# Patient Record
Sex: FEMALE | Race: WHITE | NOT HISPANIC OR LATINO | URBAN - METROPOLITAN AREA
[De-identification: names, ages, dates, MRNs, and addresses within clinical notes are randomized per-mention and may not be internally consistent; named-entity substitution may affect disease eponyms.]

---

## 2017-11-27 ENCOUNTER — HOSPITAL ENCOUNTER (OUTPATIENT)
Dept: MAMMOGRAPHY | Facility: HOSPITAL | Age: 62
Discharge: HOME | End: 2017-11-27
Attending: FAMILY MEDICINE

## 2017-11-27 DIAGNOSIS — Z12.31 VISIT FOR SCREENING MAMMOGRAM: ICD-10-CM

## 2018-07-11 ENCOUNTER — RECORDS - HEALTHEAST (OUTPATIENT)
Dept: LAB | Facility: CLINIC | Age: 63
End: 2018-07-11

## 2018-07-11 LAB
ALBUMIN SERPL-MCNC: 3.7 G/DL (ref 3.5–5)
ALP SERPL-CCNC: 77 U/L (ref 45–120)
ALT SERPL W P-5'-P-CCNC: 27 U/L (ref 0–45)
ANION GAP SERPL CALCULATED.3IONS-SCNC: 9 MMOL/L (ref 5–18)
AST SERPL W P-5'-P-CCNC: 22 U/L (ref 0–40)
BILIRUB SERPL-MCNC: 0.4 MG/DL (ref 0–1)
BUN SERPL-MCNC: 15 MG/DL (ref 8–22)
CALCIUM SERPL-MCNC: 9.3 MG/DL (ref 8.5–10.5)
CHLORIDE BLD-SCNC: 111 MMOL/L (ref 98–107)
CHOLEST SERPL-MCNC: 174 MG/DL
CO2 SERPL-SCNC: 22 MMOL/L (ref 22–31)
CREAT SERPL-MCNC: 0.76 MG/DL (ref 0.6–1.1)
FASTING STATUS PATIENT QL REPORTED: NORMAL
GFR SERPL CREATININE-BSD FRML MDRD: >60 ML/MIN/1.73M2
GLUCOSE BLD-MCNC: 84 MG/DL (ref 70–125)
HDLC SERPL-MCNC: 55 MG/DL
LDLC SERPL CALC-MCNC: 103 MG/DL
POTASSIUM BLD-SCNC: 4.5 MMOL/L (ref 3.5–5)
PROT SERPL-MCNC: 6.7 G/DL (ref 6–8)
SODIUM SERPL-SCNC: 142 MMOL/L (ref 136–145)
TRIGL SERPL-MCNC: 79 MG/DL
TSH SERPL DL<=0.005 MIU/L-ACNC: 1.33 UIU/ML (ref 0.3–5)

## 2018-07-12 LAB
HPV SOURCE: NORMAL
HUMAN PAPILLOMA VIRUS 16 DNA: NEGATIVE
HUMAN PAPILLOMA VIRUS 18 DNA: NEGATIVE
HUMAN PAPILLOMA VIRUS FINAL DIAGNOSIS: NORMAL
HUMAN PAPILLOMA VIRUS OTHER HR: NEGATIVE
SPECIMEN DESCRIPTION: NORMAL

## 2018-07-18 LAB
BKR LAB AP ABNORMAL BLEEDING: NO
BKR LAB AP BIRTH CONTROL/HORMONES: NORMAL
BKR LAB AP CERVICAL APPEARANCE: NORMAL
BKR LAB AP GYN ADEQUACY: NORMAL
BKR LAB AP GYN INTERPRETATION: NORMAL
BKR LAB AP HPV REFLEX: NORMAL
BKR LAB AP LMP: 2008
BKR LAB AP PATIENT STATUS: NORMAL
BKR LAB AP PREVIOUS ABNORMAL: NORMAL
BKR LAB AP PREVIOUS NORMAL: NORMAL
HIGH RISK?: NO
PATH REPORT.COMMENTS IMP SPEC: NORMAL
RESULT FLAG (HE HISTORICAL CONVERSION): NORMAL

## 2018-08-28 ENCOUNTER — COMMUNICATION - HEALTHEAST (OUTPATIENT)
Dept: SURGERY | Facility: CLINIC | Age: 63
End: 2018-08-28

## 2018-09-14 ENCOUNTER — COMMUNICATION - HEALTHEAST (OUTPATIENT)
Dept: SURGERY | Facility: CLINIC | Age: 63
End: 2018-09-14

## 2018-10-22 ENCOUNTER — COMMUNICATION - HEALTHEAST (OUTPATIENT)
Dept: SURGERY | Facility: CLINIC | Age: 63
End: 2018-10-22

## 2018-10-25 ENCOUNTER — AMBULATORY - HEALTHEAST (OUTPATIENT)
Dept: LAB | Facility: CLINIC | Age: 63
End: 2018-10-25

## 2018-10-25 ENCOUNTER — OFFICE VISIT - HEALTHEAST (OUTPATIENT)
Dept: SURGERY | Facility: CLINIC | Age: 63
End: 2018-10-25

## 2018-10-25 DIAGNOSIS — E66.01 OBESITY, CLASS III, BMI 40-49.9 (MORBID OBESITY) (H): ICD-10-CM

## 2018-10-25 LAB
ERYTHROCYTE [DISTWIDTH] IN BLOOD BY AUTOMATED COUNT: 12.7 % (ref 11–14.5)
HCT VFR BLD AUTO: 41.4 % (ref 35–47)
HGB BLD-MCNC: 14.1 G/DL (ref 12–16)
MCH RBC QN AUTO: 28.5 PG (ref 27–34)
MCHC RBC AUTO-ENTMCNC: 34.1 G/DL (ref 32–36)
MCV RBC AUTO: 83 FL (ref 80–100)
PLATELET # BLD AUTO: 246 THOU/UL (ref 140–440)
PMV BLD AUTO: 8.2 FL (ref 7–10)
PTH-INTACT SERPL-MCNC: 119 PG/ML (ref 10–86)
RBC # BLD AUTO: 4.96 MILL/UL (ref 3.8–5.4)
VIT B12 SERPL-MCNC: 528 PG/ML (ref 213–816)
WBC: 6.3 THOU/UL (ref 4–11)

## 2018-10-25 RX ORDER — CHLORAL HYDRATE 500 MG
2 CAPSULE ORAL DAILY
Status: SHIPPED | COMMUNITY
Start: 2018-10-25

## 2018-10-25 ASSESSMENT — MIFFLIN-ST. JEOR: SCORE: 1538.02

## 2018-10-26 ENCOUNTER — AMBULATORY - HEALTHEAST (OUTPATIENT)
Dept: SURGERY | Facility: CLINIC | Age: 63
End: 2018-10-26

## 2018-10-26 ENCOUNTER — RECORDS - HEALTHEAST (OUTPATIENT)
Dept: ADMINISTRATIVE | Facility: OTHER | Age: 63
End: 2018-10-26

## 2018-10-26 ENCOUNTER — COMMUNICATION - HEALTHEAST (OUTPATIENT)
Dept: SURGERY | Facility: CLINIC | Age: 63
End: 2018-10-26

## 2018-10-26 DIAGNOSIS — E21.1 SECONDARY HYPERPARATHYROIDISM, NON-RENAL (H): ICD-10-CM

## 2018-10-26 DIAGNOSIS — R79.89 LOW VITAMIN D LEVEL: ICD-10-CM

## 2018-10-26 LAB — 25(OH)D3 SERPL-MCNC: 26.7 NG/ML (ref 30–80)

## 2018-10-31 ENCOUNTER — OFFICE VISIT - HEALTHEAST (OUTPATIENT)
Dept: SURGERY | Facility: CLINIC | Age: 63
End: 2018-10-31

## 2018-10-31 DIAGNOSIS — Z71.3 DIETARY COUNSELING: ICD-10-CM

## 2018-10-31 DIAGNOSIS — K76.0 FATTY LIVER DISEASE, NONALCOHOLIC: ICD-10-CM

## 2018-10-31 DIAGNOSIS — E66.01 OBESITY, MORBID, BMI 40.0-49.9 (H): ICD-10-CM

## 2018-10-31 ASSESSMENT — MIFFLIN-ST. JEOR: SCORE: 1533.03

## 2018-12-05 ENCOUNTER — OFFICE VISIT - HEALTHEAST (OUTPATIENT)
Dept: SURGERY | Facility: CLINIC | Age: 63
End: 2018-12-05

## 2018-12-05 DIAGNOSIS — E66.01 OBESITY, CLASS III, BMI 40-49.9 (MORBID OBESITY) (H): ICD-10-CM

## 2018-12-05 ASSESSMENT — MIFFLIN-ST. JEOR: SCORE: 1521.69

## 2019-01-09 ENCOUNTER — OFFICE VISIT - HEALTHEAST (OUTPATIENT)
Dept: SURGERY | Facility: CLINIC | Age: 64
End: 2019-01-09

## 2019-01-09 DIAGNOSIS — Z71.3 NUTRITIONAL COUNSELING: ICD-10-CM

## 2019-01-09 DIAGNOSIS — E66.01 OBESITY, CLASS III, BMI 40-49.9 (MORBID OBESITY) (H): ICD-10-CM

## 2019-01-09 DIAGNOSIS — K76.0 FATTY LIVER DISEASE, NONALCOHOLIC: ICD-10-CM

## 2019-01-09 ASSESSMENT — MIFFLIN-ST. JEOR: SCORE: 1517.61

## 2019-01-24 ENCOUNTER — HOSPITAL ENCOUNTER (OUTPATIENT)
Dept: MAMMOGRAPHY | Facility: CLINIC | Age: 64
Discharge: HOME | End: 2019-01-24
Attending: FAMILY MEDICINE

## 2019-01-24 DIAGNOSIS — Z12.31 VISIT FOR SCREENING MAMMOGRAM: ICD-10-CM

## 2019-01-31 ENCOUNTER — OFFICE VISIT - HEALTHEAST (OUTPATIENT)
Dept: SURGERY | Facility: CLINIC | Age: 64
End: 2019-01-31

## 2019-01-31 DIAGNOSIS — R79.89 LOW VITAMIN D LEVEL: ICD-10-CM

## 2019-01-31 DIAGNOSIS — E66.01 OBESITY, CLASS III, BMI 40-49.9 (MORBID OBESITY) (H): ICD-10-CM

## 2019-01-31 DIAGNOSIS — F50.814 BINGE-EATING DISORDER, IN PARTIAL REMISSION, MILD: ICD-10-CM

## 2019-01-31 DIAGNOSIS — E21.1 SECONDARY HYPERPARATHYROIDISM, NON-RENAL (H): ICD-10-CM

## 2019-01-31 ASSESSMENT — MIFFLIN-ST. JEOR: SCORE: 1504.45

## 2019-03-01 ENCOUNTER — OFFICE VISIT - HEALTHEAST (OUTPATIENT)
Dept: SURGERY | Facility: CLINIC | Age: 64
End: 2019-03-01

## 2019-03-01 DIAGNOSIS — Z71.3 NUTRITIONAL COUNSELING: ICD-10-CM

## 2019-03-01 DIAGNOSIS — K76.0 FATTY LIVER DISEASE, NONALCOHOLIC: ICD-10-CM

## 2019-03-01 DIAGNOSIS — E66.01 OBESITY, CLASS III, BMI 40-49.9 (MORBID OBESITY) (H): ICD-10-CM

## 2019-03-01 ASSESSMENT — MIFFLIN-ST. JEOR: SCORE: 1526.22

## 2019-03-21 ENCOUNTER — OFFICE VISIT - HEALTHEAST (OUTPATIENT)
Dept: SURGERY | Facility: CLINIC | Age: 64
End: 2019-03-21

## 2019-03-21 DIAGNOSIS — E66.01 OBESITY, CLASS III, BMI 40-49.9 (MORBID OBESITY) (H): ICD-10-CM

## 2019-03-21 DIAGNOSIS — E21.1 SECONDARY HYPERPARATHYROIDISM, NON-RENAL (H): ICD-10-CM

## 2019-03-21 DIAGNOSIS — R79.89 LOW VITAMIN D LEVEL: ICD-10-CM

## 2019-03-21 ASSESSMENT — MIFFLIN-ST. JEOR: SCORE: 1496.74

## 2019-04-24 ENCOUNTER — OFFICE VISIT - HEALTHEAST (OUTPATIENT)
Dept: SURGERY | Facility: CLINIC | Age: 64
End: 2019-04-24

## 2019-04-24 DIAGNOSIS — K76.0 FATTY LIVER DISEASE, NONALCOHOLIC: ICD-10-CM

## 2019-04-24 DIAGNOSIS — Z71.3 NUTRITIONAL COUNSELING: ICD-10-CM

## 2019-04-24 DIAGNOSIS — E66.01 OBESITY, CLASS III, BMI 40-49.9 (MORBID OBESITY) (H): ICD-10-CM

## 2019-04-24 ASSESSMENT — MIFFLIN-ST. JEOR: SCORE: 1498.55

## 2019-05-09 ENCOUNTER — AMBULATORY - HEALTHEAST (OUTPATIENT)
Dept: SURGERY | Facility: CLINIC | Age: 64
End: 2019-05-09

## 2019-05-09 DIAGNOSIS — E66.01 OBESITY, CLASS III, BMI 40-49.9 (MORBID OBESITY) (H): ICD-10-CM

## 2019-05-22 ENCOUNTER — OFFICE VISIT - HEALTHEAST (OUTPATIENT)
Dept: SURGERY | Facility: CLINIC | Age: 64
End: 2019-05-22

## 2019-05-22 ENCOUNTER — AMBULATORY - HEALTHEAST (OUTPATIENT)
Dept: LAB | Facility: CLINIC | Age: 64
End: 2019-05-22

## 2019-05-22 DIAGNOSIS — E21.1 SECONDARY HYPERPARATHYROIDISM, NON-RENAL (H): ICD-10-CM

## 2019-05-22 DIAGNOSIS — E66.812 OBESITY, CLASS II, BMI 35-39.9: ICD-10-CM

## 2019-05-22 DIAGNOSIS — R79.89 LOW VITAMIN D LEVEL: ICD-10-CM

## 2019-05-22 LAB — PTH-INTACT SERPL-MCNC: 86 PG/ML (ref 10–86)

## 2019-05-22 ASSESSMENT — MIFFLIN-ST. JEOR: SCORE: 1468.62

## 2019-05-23 LAB — 25(OH)D3 SERPL-MCNC: 39.7 NG/ML (ref 30–80)

## 2019-05-29 ENCOUNTER — COMMUNICATION - HEALTHEAST (OUTPATIENT)
Dept: SURGERY | Facility: CLINIC | Age: 64
End: 2019-05-29

## 2019-06-26 ENCOUNTER — AMBULATORY - HEALTHEAST (OUTPATIENT)
Dept: SURGERY | Facility: CLINIC | Age: 64
End: 2019-06-26

## 2019-06-26 DIAGNOSIS — E66.01 OBESITY, CLASS III, BMI 40-49.9 (MORBID OBESITY) (H): ICD-10-CM

## 2019-08-01 ENCOUNTER — OFFICE VISIT - HEALTHEAST (OUTPATIENT)
Dept: SURGERY | Facility: CLINIC | Age: 64
End: 2019-08-01

## 2019-08-01 DIAGNOSIS — E66.01 OBESITY, CLASS III, BMI 40-49.9 (MORBID OBESITY) (H): ICD-10-CM

## 2019-08-01 DIAGNOSIS — R79.89 LOW VITAMIN D LEVEL: ICD-10-CM

## 2019-08-01 RX ORDER — MULTIPLE VITAMINS W/ MINERALS TAB 9MG-400MCG
1 TAB ORAL DAILY
Status: SHIPPED | COMMUNITY
Start: 2019-08-01

## 2019-08-01 RX ORDER — ASPIRIN 81 MG/1
81 TABLET, CHEWABLE ORAL AT BEDTIME
Status: SHIPPED | COMMUNITY
Start: 2019-08-01

## 2019-08-01 ASSESSMENT — MIFFLIN-ST. JEOR: SCORE: 1486.31

## 2019-10-02 ENCOUNTER — AMBULATORY - HEALTHEAST (OUTPATIENT)
Dept: SURGERY | Facility: CLINIC | Age: 64
End: 2019-10-02

## 2019-10-02 DIAGNOSIS — E66.01 OBESITY, CLASS III, BMI 40-49.9 (MORBID OBESITY) (H): ICD-10-CM

## 2019-10-07 ENCOUNTER — AMBULATORY - HEALTHEAST (OUTPATIENT)
Dept: SURGERY | Facility: CLINIC | Age: 64
End: 2019-10-07

## 2019-10-07 DIAGNOSIS — E66.01 OBESITY, CLASS III, BMI 40-49.9 (MORBID OBESITY) (H): ICD-10-CM

## 2019-10-09 ENCOUNTER — OFFICE VISIT - HEALTHEAST (OUTPATIENT)
Dept: SURGERY | Facility: CLINIC | Age: 64
End: 2019-10-09

## 2019-10-09 DIAGNOSIS — F50.819 BINGE EATING DISORDER: ICD-10-CM

## 2019-10-09 DIAGNOSIS — F43.9 STRESS AT HOME: ICD-10-CM

## 2019-10-09 DIAGNOSIS — E66.01 OBESITY, CLASS III, BMI 40-49.9 (MORBID OBESITY) (H): ICD-10-CM

## 2019-10-09 ASSESSMENT — MIFFLIN-ST. JEOR: SCORE: 1488.12

## 2019-11-20 ENCOUNTER — RECORDS - HEALTHEAST (OUTPATIENT)
Dept: LAB | Facility: CLINIC | Age: 64
End: 2019-11-20

## 2019-11-20 LAB
ALBUMIN SERPL-MCNC: 3.4 G/DL (ref 3.5–5)
ALP SERPL-CCNC: 77 U/L (ref 45–120)
ALT SERPL W P-5'-P-CCNC: 19 U/L (ref 0–45)
ANION GAP SERPL CALCULATED.3IONS-SCNC: 7 MMOL/L (ref 5–18)
AST SERPL W P-5'-P-CCNC: 16 U/L (ref 0–40)
BILIRUB SERPL-MCNC: 0.4 MG/DL (ref 0–1)
BUN SERPL-MCNC: 13 MG/DL (ref 8–22)
CALCIUM SERPL-MCNC: 9 MG/DL (ref 8.5–10.5)
CHLORIDE BLD-SCNC: 103 MMOL/L (ref 98–107)
CHOLEST SERPL-MCNC: 141 MG/DL
CO2 SERPL-SCNC: 27 MMOL/L (ref 22–31)
CREAT SERPL-MCNC: 0.74 MG/DL (ref 0.6–1.1)
FASTING STATUS PATIENT QL REPORTED: NORMAL
GFR SERPL CREATININE-BSD FRML MDRD: >60 ML/MIN/1.73M2
GLUCOSE BLD-MCNC: 81 MG/DL (ref 70–125)
HDLC SERPL-MCNC: 50 MG/DL
LDLC SERPL CALC-MCNC: 77 MG/DL
MAGNESIUM SERPL-MCNC: 2 MG/DL (ref 1.8–2.6)
POTASSIUM BLD-SCNC: 4.6 MMOL/L (ref 3.5–5)
PROT SERPL-MCNC: 6.4 G/DL (ref 6–8)
SODIUM SERPL-SCNC: 137 MMOL/L (ref 136–145)
TRIGL SERPL-MCNC: 71 MG/DL
TSH SERPL DL<=0.005 MIU/L-ACNC: 1.07 UIU/ML (ref 0.3–5)

## 2019-12-19 ENCOUNTER — OFFICE VISIT - HEALTHEAST (OUTPATIENT)
Dept: SURGERY | Facility: CLINIC | Age: 64
End: 2019-12-19

## 2019-12-19 DIAGNOSIS — R63.2 BINGE EATING: ICD-10-CM

## 2019-12-19 DIAGNOSIS — E66.01 OBESITY, CLASS III, BMI 40-49.9 (MORBID OBESITY) (H): ICD-10-CM

## 2019-12-19 RX ORDER — TOPIRAMATE 25 MG/1
TABLET, FILM COATED ORAL
Qty: 180 TABLET | Refills: 0 | Status: SHIPPED | OUTPATIENT
Start: 2019-12-19

## 2019-12-19 RX ORDER — PANTOPRAZOLE SODIUM 40 MG/1
40 TABLET, DELAYED RELEASE ORAL DAILY
Status: SHIPPED | COMMUNITY
Start: 2019-10-22

## 2019-12-19 ASSESSMENT — MIFFLIN-ST. JEOR: SCORE: 1497.19

## 2020-01-27 ENCOUNTER — COMMUNICATION - HEALTHEAST (OUTPATIENT)
Dept: SURGERY | Facility: CLINIC | Age: 65
End: 2020-01-27

## 2020-12-21 ENCOUNTER — RECORDS - HEALTHEAST (OUTPATIENT)
Dept: LAB | Facility: CLINIC | Age: 65
End: 2020-12-21

## 2020-12-21 LAB
ALBUMIN SERPL-MCNC: 3.7 G/DL (ref 3.5–5)
ALP SERPL-CCNC: 75 U/L (ref 45–120)
ALT SERPL W P-5'-P-CCNC: 28 U/L (ref 0–45)
ANION GAP SERPL CALCULATED.3IONS-SCNC: 7 MMOL/L (ref 5–18)
AST SERPL W P-5'-P-CCNC: 25 U/L (ref 0–40)
BILIRUB SERPL-MCNC: 0.6 MG/DL (ref 0–1)
BUN SERPL-MCNC: 15 MG/DL (ref 8–22)
CALCIUM SERPL-MCNC: 9.1 MG/DL (ref 8.5–10.5)
CHLORIDE BLD-SCNC: 108 MMOL/L (ref 98–107)
CHOLEST SERPL-MCNC: 172 MG/DL
CO2 SERPL-SCNC: 27 MMOL/L (ref 22–31)
CREAT SERPL-MCNC: 0.77 MG/DL (ref 0.6–1.1)
FASTING STATUS PATIENT QL REPORTED: NORMAL
GFR SERPL CREATININE-BSD FRML MDRD: >60 ML/MIN/1.73M2
GLUCOSE BLD-MCNC: 93 MG/DL (ref 70–125)
HDLC SERPL-MCNC: 54 MG/DL
LDLC SERPL CALC-MCNC: 99 MG/DL
POTASSIUM BLD-SCNC: 4.6 MMOL/L (ref 3.5–5)
PROT SERPL-MCNC: 6.6 G/DL (ref 6–8)
SODIUM SERPL-SCNC: 142 MMOL/L (ref 136–145)
TRIGL SERPL-MCNC: 95 MG/DL
TSH SERPL DL<=0.005 MIU/L-ACNC: 1.85 UIU/ML (ref 0.3–5)

## 2020-12-22 LAB
25(OH)D3 SERPL-MCNC: 41.8 NG/ML (ref 30–80)
BACTERIA SPEC CULT: NORMAL

## 2021-05-25 ENCOUNTER — RECORDS - HEALTHEAST (OUTPATIENT)
Dept: ADMINISTRATIVE | Facility: CLINIC | Age: 66
End: 2021-05-25

## 2021-05-27 ENCOUNTER — RECORDS - HEALTHEAST (OUTPATIENT)
Dept: ADMINISTRATIVE | Facility: CLINIC | Age: 66
End: 2021-05-27

## 2021-05-28 ENCOUNTER — RECORDS - HEALTHEAST (OUTPATIENT)
Dept: ADMINISTRATIVE | Facility: CLINIC | Age: 66
End: 2021-05-28

## 2021-05-28 NOTE — PROGRESS NOTES
Non-surgical Weight Loss Follow Up Diet Evaluation    Assessment:  This patient is a 63 y.o. female is being seen today for follow-up non-surgical nutritional evaluation. Today we reviewed the patients current eating habits and level of physical activity, and instructed on the changes that are required for successful weight loss outcomes.    Bupropion/Naltrexone:taking, patient reports that this has helped with her binge eating    Pt's Initial Weight: 229 lbs  Weight: 220 lb 4.8 oz (99.9 kg)  Weight loss from initial: 8.7  % Weight loss: 3.8 %    BMI: Body mass index is 40.62 kg/m .  IBW: 105-110 lbs    Personal goal weight: 150lb     Pt Active Problem List Diagnosis:    Patient Active Problem List   Diagnosis     Obesity, Class III, BMI 40-49.9 (morbid obesity) (H)     Secondary hyperparathyroidism, non-renal (H)     Low vitamin D level     Binge-eating disorder, in partial remission, mild       Estimated RMR (Charlotte-St Jeor equation): 1501 calories  Protein requirements (.5grams to .9grams per pound IBW, 20-30% of calories, minimum of 60-80gm per day):  53-94 grams    Progress made since last visit: patient is getting out and moving more, she has gone for a bike ride twice     Concerns: stress of taking care of her , portion control at times      We discussed the timing of meals today. The patient wants to eat her dinner meal a bit earlier in the day to help with her GERD.   Patient and I reviewed the importance of eating three consistent meals per day; as well as meal timing to be spaced 4-5 hours apart.  Snack choices: 100-150 calories (1-2x/day if physically hungry), incorporating a fruit/vegetable w/ protein source.    Portion Sizes problematic? YES per patient/diet recall  Encouraged slowing meal times down, 20-30 minutes, chewing to applesauce consistency.   To aid in proper portion control and slow meal time down discussed consuming meals off smaller plates, use toddler/children utensils and set  utensils down after each bite.    Protein, vegetables/fruits, carbohydrates: Patient is doing well with balance, but reports eating a bit more sweets over the holiday weekend.  The patient and I discussed the importance of including lean/low fat protein at each meal and limiting carbohydrate intake to less than 25% of plate volume.       Beverages (Type/Oz. per day)  Water: mineral water for a treat in the evening    Patient has stopped drinking wine.    Discussed the importance of adequate hydration and the goal of 64+ oz of fluid daily.   The patient understands the importance of  avoiding all sweetened and alcoholic drinks, and instead choosing 64 oz plain water.    Exercise  Exercise bike, treadmill, walking and riding bike- 5 days per week    Pt's understands that 45-60 minutes of daily activity is an important part of weight loss success.   Encouraged pt to incorporate  strength training exercise in addition to cardiovascular exercise most days of the week.    PES statement:     1. (NB-1.7) Undesirable food choices related to lack of motivation and/or readiness to apply change as evidenced by inaccurate or incomplete understanding of the bariatric guidelines; inability to apply guideline information; inability to select, or unwillingness or disinterest in selecting, food consistent with the bariatric guidelines      Intervention:  Discussion:  1. Educated pt on GERD Medical Nutrition Therapy handout.  2. Reviewed lean protein sources.    3. Plate Method: The patient and I discussed the importance of including lean/low  fat protein at each meal and limiting carbohydrate intake to less  than 25% of plate volume.  Instructions/Goals:   1. Track food in journal and record weight 2-3 times per week. Patient was also encouraged to track her GERD symptoms to pin point foods that may be causing problems.  2. Go to the farmer's market    Handouts Provided:  GERD MNT handout    Monitor/Evaluation:    Pt will f/u in one  month with bariatrician, and f/u in two months with RD.    Plan for next visit with RD:  Review plate method   Educated pt on food labels  Review carbohydrates/fiber  Exercise      Time In: 1:40p  Time Out: 2:10p      ABN signed: Yes

## 2021-05-29 ENCOUNTER — RECORDS - HEALTHEAST (OUTPATIENT)
Dept: ADMINISTRATIVE | Facility: CLINIC | Age: 66
End: 2021-05-29

## 2021-05-29 NOTE — PATIENT INSTRUCTIONS - HE
Plan:  1. Excellent work on approaching your 10% weight reduction goal of 207 lbs. Continue with your bupropion and naltrexone regimen, we can consider adding Plenity this Fall when available if affordable.   2. Continue walking goals of 10,000 steps most days of the week and adding in some fun activity on weekends, consider paddle sports on [place].  3. Due for recheck of D and PTH levels today, I'll update you with results next week.        Bupropion/Naltrexone Patient Information (trade name, CONTRAVE)    This medication is used for weight loss.  In studies people lost an averaged 5-8% of their starting weight compared to placebo (0-1%).    Often, this medication will be written as 2 separate prescriptions to improve cost to the patient. The trade name drug CONTRAVE comes as a combination of 8mg naltrexone/90mg bupropion and a 3 week escalating dose regimen going from one tablet daily up to a max of 2 tabs twice daily:  Week 1: one tablet in the daytime.  Week 2: one tablet A.M.  And One tablet in the PM.  Week 3: 2 tabs AM  And One tablet in the PM.  Week 4: 2 tabs AM and 2 tabs PM.     The generic Rx I write for is as follows:  I recommend starting One 75 mg bupropion and 1/2 a tablet of naltrexone (25mg) daily for 10 days and then moving up to One 75 mg bupropion tablet twice daily and half a naltrexone tablet twice daily.  Depending on your results/tolerance, we may increase the Bupropion up to a maximum of 150 mg twice daily of the immediate release medication or one Bupropion  mg-300 mg daily. Sometimes we'll have to go slower with the dose escalation.    Like any weight loss medication, showing results and having tolerable or no side effects is vital to continuing therapy and if either no significant weight loss after 8-12 weeks or too many side effects then we would discontinue therapy and look for alternative options/assistance.    AVOID taking with high fat meals as this increases bupropion  levels, but some food in the stomach can help decrease nausea side effects from the Naltrexone.    People who take Metoprolol may need to decrease their dose of metoprolol as the bupropion increases the effect of metoprolol 2-4 fold in some cases and low blood pressure/low heart rate could occur.    Patients should STOP CONTRAVE if they have a severe allergic reaction to CONTRAVE.  Patients should be told that CONTRAVE should be discontinued and not restarted if they experience a seizure while on treatment.    Patients should be advised that the excessive use or abrupt discontinuation of alcohol,benzodiazepines, antiepileptic drugs, or sedatives/hypnotics can increase the risk of seizure.    Patients should be advised to minimize or avoid use of alcohol.    Patients should be advised that if they previously used opioids, they may be more sensitive to lower doses of opioids and at risk of accidental overdose should they use opioids after CONTRAVE treatment is discontinued or temporarily interrupted.  Patients should be advised that because naltrexone, a component of CONTRAVE, can block the effects of opioids, they will not perceive any effect if they attempt to self-administer anyopioid drug in small doses while on CONTRAVE. Further advise patients that the attempt to administer large doses of any opioid or to bypass the blockade while on CONTRAVE may lead to serious injury, coma, or death.    Patients should be off all opioids for a minimum of 7 to 10 days before starting CONTRAVE in order to avoid precipitation of withdrawal. Advise patients they should not take CONTRAVE if they have any symptoms of opioid withdrawal.   Patients should be advised to call their healthcare provider if they experience increased blood pressure or heart rate.  Patients should be advised to notify their healthcare provider if they are taking, or plan to take, any prescription or over-the-counter drugs. Concern is warranted because  CONTRAVE and other drugs may affect each other s metabolism.  Patients should be advised to notify their healthcare provider if they become pregnant, intend to become pregnant, or are breastfeeding during therapy.  CONTRAVE should NOT be taken if pregnant or nursing.    Patients with type 2 diabetes mellitus on antidiabetic therapy should be advised to monitor their blood glucose levels and report symptoms of hypoglycemia to their healthcare provider(s).    Patients should be advised to swallow CONTRAVE tablets whole so that the release rate is not altered. Do not chew, divide, or crush tablets if using the Trade drug Contrave, dividing the generic naltrexone is fine.    As always, if any questions/concerns, don't hesitate to call us at the [facility] clinic.    Kalia Rojas MD  [phone].  5/22/2019          Exercise Guidance    Nearly everything that bothers us gets better when the proper amount of exercise can be done in the proper amounts.  Getting to that level safely and without injury is the key.  When it comes to weight loss, exercise is especially important in maintaining the weight loss.  Unfortunately, one of the harsh realities is that substantial weight loss slows our metabolism, often anywhere from 5-20%.    Our brain always remembers our heaviest weight and we can return to that if we're not mindful and moving regularly.  Our biology doesn't understand the concept of having too much energy, only not having enough.  As such, when we lose weight, it's thought that the brain interprets this as we're ill or in a famine and dials back our metabolism to limit further weight loss.  This is why exercise is so important in keeping the weight off and is the main reason people have some weight regain from their low weight point after weight loss.  We have to make up that 10-20% of calories not being burned.Since we can restrict our intake for only so long, exercise becomes very  important in our long term healthy weigh maintenance to balance out the occasional indiscretion.    Generally, for every 5% body weight reduction in a weight loss season, a person needs to add  kilocalories of exercise in their daily routine to keep that weight off for the long term.  This is why it's vital to be starting your fitness regimen during weight loss season, it becomes so vital for long term success in maintaining that weight loss.    Additionally, all sorts of good enzymes and genes turn on with exercise and our stress, sleep, mood and bodies feel better when we can get to the point of making ourselves a little sweaty and short of breath 35-50 minutes most days of the week.    Who isn't ready for exercise? Well, if you get severe dizziness/palpitations, chest pain or short of breath/faint with even minimal activity like walking across a room or you're having to pause while going up a flight of stairs, then getting your heart and/or lungs fully evaluated prior to starting an exercise regimen is recommended. Everyone else can probably start a program, but everyone may start at a different point:  Some can set a 5-10 minute walking goal and others will be able to ride their bike for an hour.      Start with where you're at and look to add 10% more each week until you're at that 150 minutes or more a week (or 75 minutes/week or more of vigorous exercise). Moderate exercise can be estimated as the pace you can carry on a conversation and vigorous is the pace at which you can get 3-5 words out before having to take a breath.  If you're using heart rate monitoring, Moderate is about 60% of your maximum heart rate and vigorous about 75%. (Max heart rate estimated as 220 beats minus your age:  Example: 220-age of 45 =176 Beats per minute (BPM) maximum. 0.6X 176= 105 BPM (moderate), 132 BMP(vigorous)).    If you like to count steps, the 10,000 steps per day does correlate well with weight maintenance but try  to make at least 20-25% of those steps at a brisk pace (like you are about to miss your bus).    Let's move!  Kalia Rojas MD.         To help lose weight in a safe way and sustainable way, I'd like to start you on a 1300 calorie diet each day.  Understanding that every 3500 calorie deficit adds up to a pound of weight reduction, with the combination of light aerobic exercise, some modest weight training and diet, we should be able to lose around 1 to 2.5lbs weekly depending on your starting height and weight.    Hunger and fatigue are the enemies of weight loss and behavior change in general.  We become much more habit and reactive in our eating when we're hungry and/or tired and frequently have less self control.  It's not a personal failing, it's just body chemistry.   We can combat this by trying to avoid being tired and hungry at the same time.  To help this,  try to front load your calories in the first 10 hours of you day so you get into the fatigued evening hours reasonably full and you can control impulses/mindless eating a lot better and avoid those bedtime snacks/evening treats that the tired brain craves.    Weight loss goes through ups and downs and plateaus but if you stay on the program, you will enjoy success.   Commit to the process, try to be good at least 19 days out of 20 and continue to think about why you're doing this and what you're working towards. If you haven't thought of your reward for hitting your weight loss goals, think about it now. Using these little victory bribes along the way helps a lot.    Finding a diet that is satisfying and repeatable-- day in and day out, improves success.  An outline of how to break up the day's food is given below.  It is a starting point and example of what a 1300 calorie diet looks like and you can modify the listed foods to suite your particular tastes, but pay attention to portions.   Do not skip breakfast as it will leave you hungry and lead to  overeating at some point during the rest of the day.  If you can make Supper the last food for the day more days of the week then not it can help.  That means that those first 10-12 hours of the day and hitting your protein/intake targets for all three meals is vital to your success and evening hunger.    Start reading labels so you know that you're getting what you think you are and start measuring foods so you can eventually look at a portion and understand how it will provide the fuel you want.    Prepping raw veggies after you buy them (washing and putting into bags/tupperware for easy access), cooking several chicken breasts for the next 2-3 lunches and generally being able to grab and go what will keep you on target when time is short will greatly aid your success.  Prepare and plan and success will follow.    Read labels:  for protein portions/yogurt, protein bars etc looking for items with more than 10 grams of protein and less than 10 grams of sugar is very helpful.  Frozen meals should have at least 18 grams of protein, under 10 grams of sugar as well (typically around 300 calories).    Please note: if you've had previous bariatric surgery: wait 20-30 minutes before/after eating to drink your beverages to avoid early fullness/dumping syndrome/worsening malabsorption, early loss of fullness and hunger.  Start with eating your protein first, slow down your meals and chew thoroughly.          1300 calorie diet:  Think Big Breakfast, Medium Lunch, smaller dinner.    Breakfast goal of 300 calories-350 calories (egg, 1/3 to 1/2 cup cooked old fashioned oatmeal or steel cut oats and berries,3-4oz greek yogurt.)Glass of water and if you like coffee (black) or tea.        Mid morning Snack or part of lunch, about 2-2.5 hrs later: 100 calories (cottage cheese/string cheese/ fruit or banana) and a handful of cruchy/green veggies (cucumber/celery/green peppers/broccoli).  Small glass of water    Lunch:  300 calories  (tuna with little bustillo (no bread), apple, salad with drizzle of olive oil/balsamic vinegar for example)  Water    Snack:  100 calories.  4-5 oz Greek Yogurt with at least 17 grams of protein per serving.    Or Cottage cheese (lower sodium version preferable). Get this in about 2 hrs before you plan to have dinner. Protein drinks with at least 15-20 grams of protein and less than 6 grams of sugar could be used here to hit protein goals and decrease afternoon/evening hunger.  Glass of water    Dinner:  350 calories (4 oz meat or fish with cooked veggies or salad with minimal dressing, one piece of bread).  Glass of water or unsweetened tea with lemon    Snack: 100 calories Medium Apple or Small handful of nuts, about 2/3 to 3/4 an ounce (almonds/walnuts/cashews or pistachios are ideal).   Glass of water.    Try to avoid all soda and juice (low sodium V8 ok once a day).   You can do it!    Choose an activity that is fun/interesting and available to you such as  Going for a swim for 15 minutes, walking 40 minutes, elliptical 20 minutes or cycling 20 minutes as many days a week as you can.  If those times are too long for your fitness level, start at 10 minutes of movement and each week try to increase by 2 minutes each week.  The first 70 minutes a week (10 minutes a day only) of exercise drops the mortality rate of a sedentary person 30%!!!!  Our goal is to work up to 150 minutes or more per week of moderate to vigorous exercise  to optimize metabolism and prevent weight regain during maintenance.     10 minutes of weight lifting can be helpful as well or using some body weight exercises like wall squats, wall pushups, seat presses, yoga moves. At least twice weekly helps maintain a good strength to weight ratio, more days is better.    If you are into strenuous weight lifting or prolonged exercise, use an online calculator for how many calories you've burned and if your exercise is lasting over 60 minutes, replace  25-30% of those calories burned immediately after exercise .  For the more limited exercise (less than 500 calories burned), there is no need to add extra food unless you notice a lot of hunger on your food journal.  Usually  Even a  calorie load after longer workouts is more than adequate.  For longer efforts, hunger will increase if you don't refuel afterwards and can get meal plan off track due to hunger, so replenish immediately after the workout to keep on the diet plan and feeling good.    Exercise example:  If you burned 1000 calories during the exercise, immediately (within 30 minutes) have a snack/replacement beverage totaling 250-300 calories and ideally have a 3:1 ratio of carbohydrate grams to protein grams to keep muscles ready for exercise the next day.  Have your next, full meal within 2-3 hours of exercise.    Tips for success:  KEEP A FOOD JOURNAL and a log of daily weights.  1.  Prepare proteins ahead of time (broil chicken breasts in bulk so you can grab and go), steel cut oats can be stored in casserole dish/bowl in the fridge for quick scoop in the morning and rewarm in microwave, make use of crock pot recipes (watch salt content).    2.  Drink a 8-12 oz glass of water every 2-3 hours when awake.  We often mistake hunger for thirst, especially when losing weight.    3. Remember your Reward and Motivation when things get hard.    4.  Weigh yourself every morning and record, you'll stay on track better and learn how your body loses weight. Don't worry about 1 or 2 day patterns, but when on track you'll notice good trend downward of weight over 3-4 day segments.    5. Call or use [employer] messaging if problems/concerns .    6.  Find a handful of meals/foods that keep you on track and get into a boring routine that is sustainable for you.    7.  Take a complete multivitamin just to make sure all micronutrients are adequate during weight loss.    8. If losing hair/brittle nails it usually means  "you are not taking enough protein.  Minimum goal is 60 grams daily of protein, most people with normal kidneys do well with upwards of 100-120 grams/day of protein. Consider taking Biotin as supplement or a \"Hair and Nail\" multivitamin.  If you are hypothyroid and losing hair, see you doctor for a check up of your levels if you haven't had one recently.    9.  Getting adequate sleep is very important for starting your day properly, when we are sleep deprived, our morning appetite is suppressed and without eating an adequate breakfast, we overeat later in the day when we're tired.  Aim for 7 hours of sleep nightly if possible.  If you sleep is disturbed, perform some introspection on stressors/depression/anxiety/PTSD events or possible sleep disorders and we can trouble shoot solutions/evaulations if issues persist.    Exercise during the day, meditative breathing before bed and after waking and removing the television from the bedroom are easy ways to improve quality of sleep.  Most people can tolerate 1-5 mg of melatonin about an 60-90 minutes before they plan to go to bed if these measures aren't helping.    10. Relaxation.  Controlled breathing exercises can lower stress levels in the brain.  One technique is 4, 7, 8 breathing:  Place the tip of your tongue behind your front teeth, breath in through your mouth for 4 seconds, Hold it for 7 seconds and breath out slowly, making a leaky tire noise for 8 seconds.  Repeat 4 times.  Ideally do at the start and end of your day or if feeling stressed.  It works and it's why meditation/yoga/martial arts are often very breath based activities.  There are breathing techniques for alertness as well as relaxation out there and they can be quite helpful.        "

## 2021-05-29 NOTE — PROGRESS NOTES
"Bariatric Clinic Follow-Up Visit:    Ada Palmer is a 63 y.o.  female with Body mass index is 39.4 kg/m .  presenting here today for follow-up on non-surgical efforts for weight loss. Original Intake visit occurred on 10/25/18 with a weight of 229 lbs and BMI of 42.2.  Along with diet and behavior changes, she has been using bupropion/naltrexone to assist her weight loss goals.  See her intake visit notes for details on identified contributors to weight gain in the past.    Weight:   Wt Readings from Last 2 Encounters:   05/22/19 213 lb 11.2 oz (96.9 kg)   04/24/19 220 lb 4.8 oz (99.9 kg)    pounds  Height: 5' 1.75\" (1.568 m) (5/22/2019 10:10 AM)  Initial Weight: 229 lbs (4/24/2019  1:00 PM)  Weight: 213 lb 11.2 oz (96.9 kg) (5/22/2019 10:10 AM)  Weight loss from initial: 8.7 (4/24/2019  1:00 PM)  % Weight loss: 3.8 % (4/24/2019  1:00 PM)  BMI (Calculated): 39.4 (5/22/2019 10:10 AM)  SpO2: 98 % (5/22/2019 10:10 AM)      Comorbidities:  Patient Active Problem List   Diagnosis     Obesity, Class III, BMI 40-49.9 (morbid obesity) (H)     Secondary hyperparathyroidism, non-renal (H)     Low vitamin D level     Binge-eating disorder, in partial remission, mild       Current Outpatient Medications:      buPROPion (WELLBUTRIN) 75 MG tablet, One tablet twice daily.., Disp: 90 tablet, Rfl: 1     crisaborole (EUCRISA) 2 % Oint, DARCY EXT AA BID, Disp: , Rfl:      fluticasone (FLONASE) 50 mcg/actuation nasal spray, 2 sprays into each nostril daily., Disp: , Rfl:      naltrexone (DEPADE) 50 mg tablet, Up to one tablet twice daily., Disp: 180 tablet, Rfl: 1     omega-3/dha/epa/fish oil (FISH OIL-OMEGA-3 FATTY ACIDS) 300-1,000 mg capsule, Take 2 g by mouth daily., Disp: , Rfl:      pantoprazole (PROTONIX) 40 MG tablet, Take 40 mg by mouth daily., Disp: , Rfl:       Interim: Since our last visit, she has continued to lose weight slowly and steadily, now approaching 10% weight loss goal, She's getting 10k steps at least 3 days " weekly and tried a [employer] class this past week. Tolerating bupropion/naltrexone well. Discussed trial of Plenity this fall when available. Is taking D3 4000IUs daily and is due for D/PTH recheck given abnormal levels last fall.     Plan:   1.  Diet: continue 60-80g protein daily  2. Exercise: continue walking and paddle sports/pickle ball  3. Medication: continue bupropion/naltrexone  4.  Stress Reduction:  Doing well but  having need for esophageal stretching.  5. Goals: 207 lbs is 10% weight reduction goal. She's now dropped from Class III obesity to Class II obesity.  6. Low D w/ secondary, non renal hyperparathyrodism. Using 4000IUs D3 daily will recheck levels today.      We discussed [facility]ariat[facility] Basics including:  -eating 3 meals daily  -eating protein first  -eating slowly, chewing food well  -avoiding/limiting calorie containing beverages  -We discussed the importance of restorative sleep and stress management in maintaining a healthy weight.  -We discussed the National Weight Control Registry healthy weight maintenance strategies and ways to optimize metabolism.  -We discussed the importance of physical activity including cardiovascular and strength training in maintaining a healthier weight and explored viable options.    Most recent labs:  Lab Results   Component Value Date    WBC 6.3 10/25/2018    HGB 14.1 10/25/2018    HCT 41.4 10/25/2018    MCV 83 10/25/2018     10/25/2018     Lab Results   Component Value Date    CHOL 174 07/11/2018     Lab Results   Component Value Date    HDL 55 07/11/2018     Lab Results   Component Value Date    LDLCALC 103 07/11/2018     Lab Results   Component Value Date    TRIG 79 07/11/2018     No components found for: CHOLHDL  Lab Results   Component Value Date    ALT 27 07/11/2018    AST 22 07/11/2018    ALKPHOS 77 07/11/2018    BILITOT 0.4 07/11/2018     No results found for: HGBA1C  Lab Results   Component Value Date    AAXQILYP07 528  "10/25/2018     Lab Results   Component Value Date    UFDGRFTW20RZ 26.7 (L) 10/25/2018     No results found for: FERRITIN  Lab Results   Component Value Date     (H) 10/25/2018     No results found for: 58395  No results found for: 7597  Lab Results   Component Value Date    TSH 1.33 07/11/2018     No results found for: TESTOSTERONE    DIETARY HISTORY    Positive Changes Since Last Visit: walking more  Struggling With: stress/loss of some activity w/  as he deals w/ his esophageal issues    Knowledgeable in Reading Food Labels: improved  Getting Adequate Protein: improved  Sleeping 7-8 hours/day often  Stress management walking more    PHYSICAL ACTIVITY PATTERNS:  Cardiovascular: walking  Strength Training: paddle sports     REVIEW OF SYSTEMS  GENERAL/CONSTITUTIONAL:  nl  HEENT:   nl  CARDIOVASCULAR:   nl  PULMONARY:   nl  GASTROINTESTINAL:  nl  UROLOGIC:  nl  NEUROLOGIC:  nl  PSYCHIATRIC:  nl  MUSCULOSKELETAL/RHEUMATOLOGIC   nl  ENDOCRINE:  nl  DERMATOLOGIC:  nl    PHYSICAL EXAM:  Vitals: /66 (Patient Site: Right Arm, Patient Position: Sitting, Cuff Size: Adult Large)   Pulse 60   Ht 5' 1.75\" (1.568 m)   Wt 213 lb 11.2 oz (96.9 kg)   LMP 10/03/2009   SpO2 98%   Breastfeeding? No   BMI 39.40 kg/m    Height: 5' 1.75\" (1.568 m) (5/22/2019 10:10 AM)  Initial Weight: 229 lbs (4/24/2019  1:00 PM)  Weight: 213 lb 11.2 oz (96.9 kg) (5/22/2019 10:10 AM)  Weight loss from initial: 8.7 (4/24/2019  1:00 PM)  % Weight loss: 3.8 % (4/24/2019  1:00 PM)  BMI (Calculated): 39.4 (5/22/2019 10:10 AM)  SpO2: 98 % (5/22/2019 10:10 AM)      GEN: Pleasant, well groomed, in no acute distress  HEENT: PEERL, EOMI, airway clear .  NECK: No swelling.  HEART: Rhythm regular, rate regular, no murmur   LUNGS: Clear without crackles or wheezes. No cough..  ABDOMEN: obese.  EXTREMITIES: 2 plus palpable peripheral pulses, radial.  NEURO: Alert and Oriented X3, normal gait and speech.  SKIN: No visible rashes.        25 " minutes was spent in direct consultation, with over 50% of it spent in counseling regarding their plan for excess weight reduction and health modification.  Kalia Rojas MD  [facility]  10:28 AM

## 2021-05-31 ENCOUNTER — RECORDS - HEALTHEAST (OUTPATIENT)
Dept: ADMINISTRATIVE | Facility: CLINIC | Age: 66
End: 2021-05-31

## 2021-05-31 NOTE — PATIENT INSTRUCTIONS - HE
Plan:  1.  Continue working on routine and consistency. If struggling we could consider meal replacement at the 8900-9760 kcal range.    2. We'll consider adding Plenity to your bupropion/naltrexone once available and schedule our next follow up to coincide with likely availability.  3. Continue to have regular 3-5 days weekly exercise.  4. Continue vitamin D.  5. Limit DQ trips to 1-2 times monthly or less. Consider yogurt instead and limiting treats.

## 2021-05-31 NOTE — PROGRESS NOTES
"Bariatric Clinic Follow-Up Visit:    Ada Palmer is a 64 y.o.  female with Body mass index is 40.12 kg/m .  presenting here today for follow-up on non-surgical efforts for weight loss. Original Intake visit occurred on 10/25/18 with a weight of 229 lbs and BMI of 42.2.  Along with diet and behavior changes, she has been using bupropion/naltrexone to assist her weight loss goals.  See her intake visit notes for details on identified contributors to weight gain in the past.    Weight:   Wt Readings from Last 2 Encounters:   08/01/19 217 lb 9.6 oz (98.7 kg)   05/22/19 213 lb 11.2 oz (96.9 kg)    pounds  Height: 5' 1.75\" (1.568 m) (8/1/2019  1:11 PM)  Initial Weight: 229 lbs (5/22/2019 10:10 AM)  Weight: 217 lb 9.6 oz (98.7 kg) (8/1/2019  1:11 PM)  Weight loss from initial: 15.3 (5/22/2019 10:10 AM)  % Weight loss: 6.68 % (5/22/2019 10:10 AM)  BMI (Calculated): 40.1 (8/1/2019  1:11 PM)  SpO2: 95 % (8/1/2019  1:11 PM)      Comorbidities:  Patient Active Problem List   Diagnosis     Binge-eating disorder, in partial remission, mild     Obesity, Class II, BMI 35-39.9       Current Outpatient Medications:      aspirin 81 mg chewable tablet, Chew 81 mg at bedtime., Disp: , Rfl:      buPROPion (WELLBUTRIN) 75 MG tablet, One tablet twice daily.., Disp: 90 tablet, Rfl: 1     cetirizine (ZYRTEC) 10 MG tablet, Take 10 mg by mouth., Disp: , Rfl:      cholecalciferol, vitamin D3, (VITAMIN D3) 5,000 unit Tab, Take by mouth., Disp: , Rfl:      crisaborole (EUCRISA) 2 % Oint, DARCY EXT AA BID, Disp: , Rfl:      fluticasone (FLONASE) 50 mcg/actuation nasal spray, 2 sprays into each nostril daily., Disp: , Rfl:      multivitamin with minerals (THERA-M) 9 mg iron-400 mcg Tab tablet, Take 1 tablet by mouth daily., Disp: , Rfl:      naltrexone (DEPADE) 50 mg tablet, Up to one tablet twice daily., Disp: 180 tablet, Rfl: 1     omega-3/dha/epa/fish oil (FISH OIL-OMEGA-3 FATTY ACIDS) 300-1,000 mg capsule, Take 2 g by mouth daily., Disp: , " "Rfl:      pantoprazole (PROTONIX) 40 MG tablet, Take 40 mg by mouth daily., Disp: , Rfl:       Interim: Since our last visit, she has gained a few pounds after vacation and some \"summer eating\" with more treats/alcohol and dining out/DQ trips w/ ice cream favoring .    Plan:   1. Continue working on routine and consistency. If struggling we could consider meal replacement at the 1593-9446 kcal range.    2. We'll consider adding Plenity to your bupropion/naltrexone once available and schedule our next follow up to coincide with likely availability.  3. Continue to have regular 3-5 days weekly exercise.  4. Continue vitamin D. Resolved deficiency/hyperparathyroidism.  5. Limit DQ trips to 1-2 times monthly or less. Consider yogurt instead and limiting treats.         We discussed [facility]ariat[facility] Basics including:  -Discussed meal replacement options and Plenity. Contemplative about each.  Discussed importance of routine/ exercise.  Most recent labs:  Lab Results   Component Value Date    WBC 6.3 10/25/2018    HGB 14.1 10/25/2018    HCT 41.4 10/25/2018    MCV 83 10/25/2018     10/25/2018     Lab Results   Component Value Date    CHOL 174 07/11/2018     Lab Results   Component Value Date    HDL 55 07/11/2018     Lab Results   Component Value Date    LDLCALC 103 07/11/2018     Lab Results   Component Value Date    TRIG 79 07/11/2018     No components found for: CHOLHDL  Lab Results   Component Value Date    ALT 27 07/11/2018    AST 22 07/11/2018    ALKPHOS 77 07/11/2018    BILITOT 0.4 07/11/2018     No results found for: HGBA1C  Lab Results   Component Value Date    OGQZPMBC64 528 10/25/2018     Lab Results   Component Value Date    CPKWRGAY47VW 39.7 05/22/2019     No results found for: FERRITIN  Lab Results   Component Value Date    PTH 86 05/22/2019     No results found for: 69694  No results found for: 7597  Lab Results   Component Value Date    TSH 1.33 07/11/2018     No results found for: " "TESTOSTERONE    DIETARY HISTORY    Positive Changes Since Last Visit: understands where she struggles  Struggling With: comfort/sweet eating    Knowledgeable in Reading Food Labels: yes  PHYSICAL ACTIVITY PATTERNS:  Cardiovascular: pickle ball, swimming  Strength Training: swimming    REVIEW OF SYSTEMS  GENERAL/CONSTITUTIONAL:  nl  HEENT:   nl  CARDIOVASCULAR:   nl  PULMONARY:   nl  GASTROINTESTINAL:  nl  UROLOGIC:  nl  NEUROLOGIC:  nl  PSYCHIATRIC:  Still feels she has some binge eating tendencies.   MUSCULOSKELETAL/RHEUMATOLOGIC   na  ENDOCRINE:  na  DERMATOLOGIC:  Eczema flare up    PHYSICAL EXAM:  Vitals: /90 (Patient Site: Right Arm, Patient Position: Sitting, Cuff Size: Adult Large)   Pulse 68   Ht 5' 1.75\" (1.568 m)   Wt 217 lb 9.6 oz (98.7 kg)   LMP 10/03/2009   SpO2 95%   Breastfeeding? No   BMI 40.12 kg/m    Height: 5' 1.75\" (1.568 m) (8/1/2019  1:11 PM)  Initial Weight: 229 lbs (5/22/2019 10:10 AM)  Weight: 217 lb 9.6 oz (98.7 kg) (8/1/2019  1:11 PM)  Weight loss from initial: 15.3 (5/22/2019 10:10 AM)  % Weight loss: 6.68 % (5/22/2019 10:10 AM)  BMI (Calculated): 40.1 (8/1/2019  1:11 PM)  SpO2: 95 % (8/1/2019  1:11 PM)      GEN: Pleasant, well groomed, in no acute distress  HEENT: PEERL, EOMI, airway clear .  NECK: No swelling.  HEART: Rhythm regular, rate regular, no murmur   LUNGS: Clear without crackles or wheezes. No cough.  ABDOMEN: obese.  EXTREMITIES: no tremor palpable peripheral pulses, no edema .  NEURO: Alert and Oriented X3, normal gait and speech.  SKIN: No visible rashes.        25 minutes was spent in direct consultation, with over 50% of it spent in counseling regarding their plan for excess weight reduction and health modification.  Kalia Rojas MD  [facility]  1:37 PM    "

## 2021-06-02 VITALS — HEIGHT: 62 IN | BODY MASS INDEX: 41.31 KG/M2 | WEIGHT: 224.5 LBS

## 2021-06-02 VITALS — BODY MASS INDEX: 41.48 KG/M2 | WEIGHT: 225.4 LBS | HEIGHT: 62 IN

## 2021-06-02 VITALS — BODY MASS INDEX: 40.46 KG/M2 | WEIGHT: 219.9 LBS | HEIGHT: 62 IN

## 2021-06-02 VITALS — HEIGHT: 62 IN | WEIGHT: 226.4 LBS | BODY MASS INDEX: 41.66 KG/M2

## 2021-06-02 VITALS — WEIGHT: 229 LBS | HEIGHT: 62 IN | BODY MASS INDEX: 42.14 KG/M2

## 2021-06-02 VITALS — BODY MASS INDEX: 41.94 KG/M2 | WEIGHT: 227.9 LBS | HEIGHT: 62 IN

## 2021-06-02 VITALS — HEIGHT: 62 IN | WEIGHT: 221.6 LBS | BODY MASS INDEX: 40.78 KG/M2

## 2021-06-02 NOTE — PATIENT INSTRUCTIONS - HE
Plan:  1. Good job with weight maintenance during a stressful time.   2. Restart the walking program as your life allows. In the meanwhile if you were to track calories and protein, Aiming for 5710-2223 if you're getting 60-80 grams of protein. Hydrate well w/ 64 oz water daily.  3. Plenity should be available Dec/January, updates coming first week in November.            To help lose weight in a safe way and sustainable way, I'd like to start you on a 1300 calorie diet each day.  Understanding that every 3500 calorie deficit adds up to a pound of weight reduction, with the combination of light aerobic exercise, some modest weight training and diet, we should be able to lose around 1 to 2.5lbs weekly depending on your starting height and weight.    Hunger and fatigue are the enemies of weight loss and behavior change in general.  We become much more habit and reactive in our eating when we're hungry and/or tired and frequently have less self control.  It's not a personal failing, it's just body chemistry.   We can combat this by trying to avoid being tired and hungry at the same time.  To help this,  try to front load your calories in the first 10 hours of you day so you get into the fatigued evening hours reasonably full and you can control impulses/mindless eating a lot better and avoid those bedtime snacks/evening treats that the tired brain craves.    Weight loss goes through ups and downs and plateaus but if you stay on the program, you will enjoy success.   Commit to the process, try to be good at least 19 days out of 20 and continue to think about why you're doing this and what you're working towards. If you haven't thought of your reward for hitting your weight loss goals, think about it now. Using these little victory bribes along the way helps a lot.    Finding a diet that is satisfying and repeatable-- day in and day out, improves success.  An outline of how to break up the day's food is given below.  It  is a starting point and example of what a 1300 calorie diet looks like and you can modify the listed foods to suite your particular tastes, but pay attention to portions.   Do not skip breakfast as it will leave you hungry and lead to overeating at some point during the rest of the day.  If you can make Supper the last food for the day more days of the week then not it can help.  That means that those first 10-12 hours of the day and hitting your protein/intake targets for all three meals is vital to your success and evening hunger.    Start reading labels so you know that you're getting what you think you are and start measuring foods so you can eventually look at a portion and understand how it will provide the fuel you want.    Prepping raw veggies after you buy them (washing and putting into bags/tupperware for easy access), cooking several chicken breasts for the next 2-3 lunches and generally being able to grab and go what will keep you on target when time is short will greatly aid your success.  Prepare and plan and success will follow.    Read labels:  for protein portions/yogurt, protein bars etc looking for items with more than 10 grams of protein and less than 10 grams of sugar is very helpful.  Frozen meals should have at least 18 grams of protein, under 10 grams of sugar as well (typically around 300 calories).    Please note: if you've had previous bariatric surgery: wait 20-30 minutes before/after eating to drink your beverages to avoid early fullness/dumping syndrome/worsening malabsorption, early loss of fullness and hunger.  Start with eating your protein first, slow down your meals and chew thoroughly.          1300 calorie diet:  Think Big Breakfast, Medium Lunch, smaller dinner.    Breakfast goal of 300 calories-350 calories (egg, 1/3 to 1/2 cup cooked old fashioned oatmeal or steel cut oats and berries,3-4oz greek yogurt.)Glass of water and if you like coffee (black) or tea.        Mid morning  Snack or part of lunch, about 2-2.5 hrs later: 100 calories (cottage cheese/string cheese/ fruit or banana) and a handful of cruchy/green veggies (cucumber/celery/green peppers/broccoli).  Small glass of water    Lunch:  300 calories (tuna with little bustillo (no bread), apple, salad with drizzle of olive oil/balsamic vinegar for example)  Water    Snack:  100 calories.  4-5 oz Greek Yogurt with at least 17 grams of protein per serving.    Or Cottage cheese (lower sodium version preferable). Get this in about 2 hrs before you plan to have dinner. Protein drinks with at least 15-20 grams of protein and less than 6 grams of sugar could be used here to hit protein goals and decrease afternoon/evening hunger.  Glass of water    Dinner:  350 calories (4 oz meat or fish with cooked veggies or salad with minimal dressing, one piece of bread).  Glass of water or unsweetened tea with lemon    Snack: 100 calories Medium Apple or Small handful of nuts, about 2/3 to 3/4 an ounce (almonds/walnuts/cashews or pistachios are ideal).   Glass of water.    Try to avoid all soda and juice (low sodium V8 ok once a day).   You can do it!    Choose an activity that is fun/interesting and available to you such as  Going for a swim for 15 minutes, walking 40 minutes, elliptical 20 minutes or cycling 20 minutes as many days a week as you can.  If those times are too long for your fitness level, start at 10 minutes of movement and each week try to increase by 2 minutes each week.  The first 70 minutes a week (10 minutes a day only) of exercise drops the mortality rate of a sedentary person 30%!!!!  Our goal is to work up to 150 minutes or more per week of moderate to vigorous exercise  to optimize metabolism and prevent weight regain during maintenance.     10 minutes of weight lifting can be helpful as well or using some body weight exercises like wall squats, wall pushups, seat presses, yoga moves. At least twice weekly helps maintain a good  strength to weight ratio, more days is better.    If you are into strenuous weight lifting or prolonged exercise, use an online calculator for how many calories you've burned and if your exercise is lasting over 60 minutes, replace 25-30% of those calories burned immediately after exercise .  For the more limited exercise (less than 500 calories burned), there is no need to add extra food unless you notice a lot of hunger on your food journal.  Usually  Even a  calorie load after longer workouts is more than adequate.  For longer efforts, hunger will increase if you don't refuel afterwards and can get meal plan off track due to hunger, so replenish immediately after the workout to keep on the diet plan and feeling good.    Exercise example:  If you burned 1000 calories during the exercise, immediately (within 30 minutes) have a snack/replacement beverage totaling 250-300 calories and ideally have a 3:1 ratio of carbohydrate grams to protein grams to keep muscles ready for exercise the next day.  Have your next, full meal within 2-3 hours of exercise.    Tips for success:  KEEP A FOOD JOURNAL and a log of daily weights.  1.  Prepare proteins ahead of time (broil chicken breasts in bulk so you can grab and go), steel cut oats can be stored in casserole dish/bowl in the fridge for quick scoop in the morning and rewarm in microwave, make use of crock pot recipes (watch salt content).    2.  Drink a 8-12 oz glass of water every 2-3 hours when awake.  We often mistake hunger for thirst, especially when losing weight.    3. Remember your Reward and Motivation when things get hard.    4.  Weigh yourself every morning and record, you'll stay on track better and learn how your body loses weight. Don't worry about 1 or 2 day patterns, but when on track you'll notice good trend downward of weight over 3-4 day segments.    5. Call or use [employer] messaging if problems/concerns .    6.  Find a handful of meals/foods that  "keep you on track and get into a boring routine that is sustainable for you.    7.  Take a complete multivitamin just to make sure all micronutrients are adequate during weight loss.    8. If losing hair/brittle nails it usually means you are not taking enough protein.  Minimum goal is 60 grams daily of protein, most people with normal kidneys do well with upwards of 100-120 grams/day of protein. Consider taking Biotin as supplement or a \"Hair and Nail\" multivitamin.  If you are hypothyroid and losing hair, see you doctor for a check up of your levels if you haven't had one recently.    9.  Getting adequate sleep is very important for starting your day properly, when we are sleep deprived, our morning appetite is suppressed and without eating an adequate breakfast, we overeat later in the day when we're tired.  Aim for 7 hours of sleep nightly if possible.  If you sleep is disturbed, perform some introspection on stressors/depression/anxiety/PTSD events or possible sleep disorders and we can trouble shoot solutions/evaulations if issues persist.    Exercise during the day, meditative breathing before bed and after waking and removing the television from the bedroom are easy ways to improve quality of sleep.  Most people can tolerate 1-5 mg of melatonin about an 60-90 minutes before they plan to go to bed if these measures aren't helping.    10. Relaxation.  Controlled breathing exercises can lower stress levels in the brain.  One technique is 4, 7, 8 breathing:  Place the tip of your tongue behind your front teeth, breath in through your mouth for 4 seconds, Hold it for 7 seconds and breath out slowly, making a leaky tire noise for 8 seconds.  Repeat 4 times.  Ideally do at the start and end of your day or if feeling stressed.  It works and it's why meditation/yoga/martial arts are often very breath based activities.  There are breathing techniques for alertness as well as relaxation out there and they can be quite " helpful.

## 2021-06-02 NOTE — PROGRESS NOTES
"Bariatric Clinic Follow-Up Visit:    Ada Palmer is a 64 y.o.  female with Body mass index is 40.2 kg/m .  presenting here today for follow-up on non-surgical efforts for weight loss. Original Intake visit occurred on 10/25/18 with a weight of 229 lbs and BMI of 42.2.  Along with diet and behavior changes, she has been using naltrexone/bupropion to assist her weight loss goals.  See her intake visit notes for details on identified contributors to weight gain in the past.    Weight:   Wt Readings from Last 2 Encounters:   10/09/19 218 lb (98.9 kg)   08/01/19 217 lb 9.6 oz (98.7 kg)    pounds  Height: 5' 1.75\" (1.568 m) (10/9/2019  2:11 PM)  Initial Weight: 229 lbs (10/9/2019  2:11 PM)  Weight: 218 lb (98.9 kg) (10/9/2019  2:11 PM)  Weight loss from initial: 11 (10/9/2019  2:11 PM)  % Weight loss: 4.8 % (10/9/2019  2:11 PM)  BMI (Calculated): 40.2 (10/9/2019  2:11 PM)  SpO2: 96 % (10/9/2019  2:11 PM)      Comorbidities:  Patient Active Problem List   Diagnosis     Binge-eating disorder, in partial remission, mild     Obesity, Class II, BMI 35-39.9       Current Outpatient Medications:      aspirin 81 mg chewable tablet, Chew 81 mg at bedtime., Disp: , Rfl:      betamethasone dipropionate (DIPROLENE) 0.05 % cream, Apply topically., Disp: , Rfl:      buPROPion (WELLBUTRIN) 75 MG tablet, One tablet twice daily.., Disp: 180 tablet, Rfl: 1     cetirizine (ZYRTEC) 10 MG tablet, Take 10 mg by mouth., Disp: , Rfl:      cholecalciferol, vitamin D3, (VITAMIN D3) 5,000 unit Tab, Take by mouth., Disp: , Rfl:      crisaborole (EUCRISA) 2 % Oint, DARCY EXT AA BID, Disp: , Rfl:      fluticasone (FLONASE) 50 mcg/actuation nasal spray, 2 sprays into each nostril daily., Disp: , Rfl:      multivitamin with minerals (THERA-M) 9 mg iron-400 mcg Tab tablet, Take 1 tablet by mouth daily., Disp: , Rfl:      naltrexone (DEPADE) 50 mg tablet, Up to one tablet twice daily., Disp: 180 tablet, Rfl: 1     omega-3/dha/epa/fish oil (FISH " "OIL-OMEGA-3 FATTY ACIDS) 300-1,000 mg capsule, Take 2 g by mouth daily., Disp: , Rfl:      omeprazole (PRILOSEC) 20 MG capsule, TAKE BY MOUTH TWO CAPSULES ONE TIME DAILY TIL GONE FOR ACID REFLUX, Disp: , Rfl:       Interim: Since our last visit, she has been dealing with her ill  who had shoulder surgery complicated by recurrent aspiration pneumonia. As such she's caring for him/all the chores and hasn't been exercising much at all in the lastmonth and feels \"plateaued'. She's noticing that the meds do seem to curb her binge eating disorder as she hasn't had the urge at all this last month.  She feels she could get back on the treadmill for walks this week and we set a goal of walking for 6 songs. Discussed release of Plenity towards the end of this year in likelihood.  Discussed bariatric surgery not a good option unless binge eating in remission for 3 years or more.    Plan:   1.  Good job with weight maintenance during a stressful time.   2. Restart the walking program as your life allows. In the meanwhile if you were to track calories and protein, Aiming for [id number] if you're getting 60-80 grams of protein. Hydrate well w/ 64 oz water daily.  3. Plenity should be available Dec/January, updates coming first week in November.      We discussed [facility] Basics including:  -eating 3 meals daily  -eating protein first  -eating slowly, chewing food well  -avoiding/limiting calorie containing beverages  -We discussed the importance of restorative sleep and stress management in maintaining a healthy weight.  -We discussed the National Weight Control Registry healthy weight maintenance strategies and ways to optimize metabolism.  -We discussed the importance of physical activity including cardiovascular and strength training in maintaining a healthier weight and explored viable options.    Most recent labs:  Lab Results   Component Value Date    WBC 6.3 10/25/2018    HGB 14.1 10/25/2018    HCT 41.4 " "10/25/2018    MCV 83 10/25/2018     10/25/2018     Lab Results   Component Value Date    CHOL 174 07/11/2018     Lab Results   Component Value Date    HDL 55 07/11/2018     Lab Results   Component Value Date    LDLCALC 103 07/11/2018     Lab Results   Component Value Date    TRIG 79 07/11/2018     No components found for: CHOLHDL  Lab Results   Component Value Date    ALT 27 07/11/2018    AST 22 07/11/2018    ALKPHOS 77 07/11/2018    BILITOT 0.4 07/11/2018     No results found for: HGBA1C  Lab Results   Component Value Date    QOXMKQZC09 528 10/25/2018     Lab Results   Component Value Date    ZTUJCFVJ92BP 39.7 05/22/2019     No results found for: FERRITIN  Lab Results   Component Value Date    PTH 86 05/22/2019     No results found for: 43738  No results found for: 7597  Lab Results   Component Value Date    TSH 1.33 07/11/2018     No results found for: TESTOSTERONE    DIETARY HISTORY    Positive Changes Since Last Visit: no binging  Struggling With: exercise/care giver fatigue    Knowledgeable in Reading Food Labels: yes  Getting Adequate Protein: often  Sleeping 7-8 hours/day often. Since  ill not going out so less alcohol.  Stress management limited    PHYSICAL ACTIVITY PATTERNS:  Cardiovascular: no  Strength Training: no    REVIEW OF SYSTEMS  GENERAL/CONSTITUTIONAL:  Stressed at home  HEENT:   na  CARDIOVASCULAR:   no pain/palps  PULMONARY:   no cough  GASTROINTESTINAL:  No pain or rashes  UROLOGIC:  na  NEUROLOGIC:  na  PSYCHIATRIC:  Stressed at home  MUSCULOSKELETAL/RHEUMATOLOGIC   no injuries  ENDOCRINE:  na  DERMATOLOGIC:  Nor viviana    PHYSICAL EXAM:  Vitals: /64 (Patient Site: Right Arm, Patient Position: Sitting, Cuff Size: Adult Small)   Pulse 80   Ht 5' 1.75\" (1.568 m)   Wt 218 lb (98.9 kg)   LMP 10/03/2009   SpO2 96%   BMI 40.20 kg/m    Height: 5' 1.75\" (1.568 m) (10/9/2019  2:11 PM)  Initial Weight: 229 lbs (10/9/2019  2:11 PM)  Weight: 218 lb (98.9 kg) (10/9/2019  2:11 " PM)  Weight loss from initial: 11 (10/9/2019  2:11 PM)  % Weight loss: 4.8 % (10/9/2019  2:11 PM)  BMI (Calculated): 40.2 (10/9/2019  2:11 PM)  SpO2: 96 % (10/9/2019  2:11 PM)      GEN: Pleasant, well groomed, in no acute distress  HEENT: PEERL, EOMI, airway clear .  NECK: No swelling.  HEART: Rhythm regular, rate regular, no murmur   LUNGS: Clear without crackles or wheezes. No cough.  ABDOMEN: obese.  EXTREMITIES: 2 plus palpable peripheral pulses, radial.  NEURO: Alert and Oriented X3, normal gait and speech.  SKIN: No visible rashes.        25 minutes was spent in direct consultation, with over 50% of it spent in counseling regarding their plan for excess weight reduction and health modification.  Kalia Rojas MD  [facility]  2:40 PM

## 2021-06-03 VITALS
HEIGHT: 62 IN | SYSTOLIC BLOOD PRESSURE: 124 MMHG | WEIGHT: 218 LBS | HEART RATE: 80 BPM | DIASTOLIC BLOOD PRESSURE: 64 MMHG | BODY MASS INDEX: 40.12 KG/M2 | OXYGEN SATURATION: 96 %

## 2021-06-03 VITALS — HEIGHT: 62 IN | WEIGHT: 217.6 LBS | BODY MASS INDEX: 40.04 KG/M2

## 2021-06-03 VITALS — BODY MASS INDEX: 39.32 KG/M2 | HEIGHT: 62 IN | WEIGHT: 213.7 LBS

## 2021-06-03 VITALS — BODY MASS INDEX: 40.54 KG/M2 | HEIGHT: 62 IN | WEIGHT: 220.3 LBS

## 2021-06-04 VITALS
HEART RATE: 83 BPM | WEIGHT: 220 LBS | SYSTOLIC BLOOD PRESSURE: 128 MMHG | HEIGHT: 62 IN | DIASTOLIC BLOOD PRESSURE: 78 MMHG | OXYGEN SATURATION: 95 % | BODY MASS INDEX: 40.48 KG/M2

## 2021-06-04 NOTE — PROGRESS NOTES
"Bariatric Clinic Follow-Up Visit:    Ada Palmer is a 64 y.o.  female with Body mass index is 40.57 kg/m .  presenting here today for follow-up on non-surgical efforts for weight loss. Original Intake visit occurred on 10/25/18 with a weight of 229lbs and BMI of 42.2.  Along with diet and behavior changes, she has been using bupropion/naltrexone to assist her weight loss goals.  See her intake visit notes for details on identified contributors to weight gain in the past.    Weight:   Wt Readings from Last 2 Encounters:   12/19/19 220 lb (99.8 kg)   10/09/19 218 lb (98.9 kg)    pounds  Height: 5' 1.75\" (1.568 m) (12/19/2019  1:13 PM)  Initial Weight: 229 lbs (10/9/2019  2:11 PM)  Weight: 220 lb (99.8 kg) (12/19/2019  1:13 PM)  Weight loss from initial: 11 (10/9/2019  2:11 PM)  % Weight loss: 4.8 % (10/9/2019  2:11 PM)  BMI (Calculated): 40.6 (12/19/2019  1:13 PM)  SpO2: 95 % (12/19/2019  1:13 PM)      Comorbidities:  Patient Active Problem List   Diagnosis     Binge-eating disorder, in partial remission, mild     Obesity, Class II, BMI 35-39.9       Current Outpatient Medications:      aspirin 81 mg chewable tablet, Chew 81 mg at bedtime., Disp: , Rfl:      buPROPion (WELLBUTRIN) 75 MG tablet, One tablet twice daily.., Disp: 180 tablet, Rfl: 1     cholecalciferol, vitamin D3, (VITAMIN D3) 5,000 unit Tab, Take by mouth., Disp: , Rfl:      multivitamin with minerals (THERA-M) 9 mg iron-400 mcg Tab tablet, Take 1 tablet by mouth daily., Disp: , Rfl:      naltrexone (DEPADE) 50 mg tablet, Up to one tablet twice daily., Disp: 180 tablet, Rfl: 1     omega-3/dha/epa/fish oil (FISH OIL-OMEGA-3 FATTY ACIDS) 300-1,000 mg capsule, Take 2 g by mouth daily., Disp: , Rfl:      pantoprazole (PROTONIX) 40 MG tablet, Take 40 mg by mouth daily., Disp: , Rfl:       Interim: Since our last visit, she has stalled out and was interested in coming off bupropion/naltrexone and given some of her tendency towards binge eating, trial of " topamax. This continues to be her concern and mine regarding why surgery isn't appropriate for her. She'll be going to [place] for many weeks this winter and will be looking to increase her swimming/water aerobics and walking and improved protein intake/fish intake while down there and follow up with me upon return. Discussed taper to meds (bup/nal) and initiation of topamax with ramp up to 50mg with supper.    Plan:   1.  To switch therapy to topamax, taper off the bupropion and naltrexone as follows:  A. Decrease to once daily dosing of bupropion and naltrexone at breakfast for a week and then every other day for another week and then stop.  B. Start 25mg of topamax with supper for a week then increase to two tabs with supper if tolerated and stay at that dose until we follow up. Stop if change in mood/excessive sleepiness, it may cause some numbness sensation and will likely cause some distaste for carbonation. Hydrate well to reduce the mild risk for kidney stones. See below.    2. In [place] have a pleasure schedule that includes swimming/walks/fitness.  3. Plenity when available.    We discussed [facility]ariat[facility] Basics including:  -eating 3 meals daily  -eating protein first  -eating slowly, chewing food well  -avoiding/limiting calorie containing beverages  -We discussed the importance of restorative sleep and stress management in maintaining a healthy weight.  -We discussed the National Weight Control Registry healthy weight maintenance strategies and ways to optimize metabolism.  -We discussed the importance of physical activity including cardiovascular and strength training in maintaining a healthier weight and explored viable options.    Most recent labs:  Lab Results   Component Value Date    WBC 6.3 10/25/2018    HGB 14.1 10/25/2018    HCT 41.4 10/25/2018    MCV 83 10/25/2018     10/25/2018     Lab Results   Component Value Date    CHOL 141 11/20/2019     Lab Results   Component Value Date     "HDL 50 11/20/2019     Lab Results   Component Value Date    LDLCALC 77 11/20/2019     Lab Results   Component Value Date    TRIG 71 11/20/2019     No components found for: CHOLHDL  Lab Results   Component Value Date    ALT 19 11/20/2019    AST 16 11/20/2019    ALKPHOS 77 11/20/2019    BILITOT 0.4 11/20/2019     No results found for: HGBA1C  Lab Results   Component Value Date    [id number] 528 10/25/2018     Lab Results   Component Value Date    ALEWFFDQ68BN 39.7 05/22/2019     No results found for: FERRITIN  Lab Results   Component Value Date    PTH 86 05/22/2019     No results found for: 38420  No results found for: 7597  Lab Results   Component Value Date    TSH 1.07 11/20/2019     No results found for: TESTOSTERONE    DIETARY HISTORY    Positive Changes Since Last Visit: ready for a change  Struggling With: loss of mindfulness.    Knowledgeable in Reading Food Labels: yes  Getting Adequate Protein: usually  Sleeping 7-8 hours/day often  Stress management stable.    PHYSICAL ACTIVITY PATTERNS:  Cardiovascular: some walking  Strength Training: limited.    REVIEW OF SYSTEMS  GENERAL/CONSTITUTIONAL:  nl  HEENT:   nl  CARDIOVASCULAR:   nl  PULMONARY:   nl  GASTROINTESTINAL:  nl  UROLOGIC:  No hx of kidney stones.  NEUROLOGIC:  nl  PSYCHIATRIC:  Stable.  MUSCULOSKELETAL/RHEUMATOLOGIC   na  ENDOCRINE:  na  DERMATOLOGIC:  Nl    PHYSICAL EXAM:  Vitals: /78 (Patient Site: Right Arm, Patient Position: Sitting, Cuff Size: Adult Large)   Pulse 83   Ht 5' 1.75\" (1.568 m)   Wt 220 lb (99.8 kg)   LMP 10/03/2009   SpO2 95%   Breastfeeding No   BMI 40.57 kg/m    Height: 5' 1.75\" (1.568 m) (12/19/2019  1:13 PM)  Initial Weight: 229 lbs (10/9/2019  2:11 PM)  Weight: 220 lb (99.8 kg) (12/19/2019  1:13 PM)  Weight loss from initial: 11 (10/9/2019  2:11 PM)  % Weight loss: 4.8 % (10/9/2019  2:11 PM)  BMI (Calculated): 40.6 (12/19/2019  1:13 PM)  SpO2: 95 % (12/19/2019  1:13 PM)      GEN: Pleasant, well groomed, in no acute " distress  HEENT: PEERL, EOMI, airway clear .  NECK: No swelling.  HEART: Rhythm regular, rate regular, no murmur   LUNGS: Clear without crackles or wheezes. No cough.  ABDOMEN: obese..  EXTREMITIES: 2 plsu palpable peripheral pulses, radial..  NEURO: Alert and Oriented X3, normal gait and speech.  SKIN: No visible rashes.        25 minutes was spent in direct consultation, with over 50% of it spent in counseling regarding their plan for excess weight reduction and health modification.  Kalia Rojas MD  [facility]  1:28 PM

## 2021-06-04 NOTE — PATIENT INSTRUCTIONS - HE
Plan:  1. To switch therapy to topamax, taper off the bupropion and naltrexone as follows:  A. Decrease to once daily dosing of bupropion and naltrexone at breakfast for a week and then every other day for another week and then stop.  B. Start 25mg of topamax with supper for a week then increase to two tabs with supper if tolerated and stay at that dose until we follow up. Stop if change in mood/excessive sleepiness, it may cause some numbness sensation and will likely cause some distaste for carbonation. Hydrate well to reduce the mild risk for kidney stones. See below.    2. In [place] have a pleasure schedule that includes swimming/walks/fitness.        Exercise Guidance    Nearly everything that bothers us gets better when the proper amount of exercise can be done in the proper amounts.  Getting to that level safely and without injury is the key.  When it comes to weight loss, exercise is especially important in maintaining the weight loss.  Unfortunately, one of the harsh realities is that substantial weight loss slows our metabolism, often anywhere from 5-20%.    Our brain always remembers our heaviest weight and we can return to that if we're not mindful and moving regularly.  Our biology doesn't understand the concept of having too much energy, only not having enough.  As such, when we lose weight, it's thought that the brain interprets this as we're ill or in a famine and dials back our metabolism to limit further weight loss.  This is why exercise is so important in keeping the weight off and is the main reason people have some weight regain from their low weight point after weight loss.  We have to make up that 10-20% of calories not being burned.Since we can restrict our intake for only so long, exercise becomes very important in our long term healthy weigh maintenance to balance out the occasional indiscretion.    Generally, for every 5% body weight reduction in a weight loss season, a person needs to  add  kilocalories of exercise in their daily routine to keep that weight off for the long term.  This is why it's vital to be starting your fitness regimen during weight loss season, it becomes so vital for long term success in maintaining that weight loss.    Additionally, all sorts of good enzymes and genes turn on with exercise and our stress, sleep, mood and bodies feel better when we can get to the point of making ourselves a little sweaty and short of breath 35-50 minutes most days of the week. But we have to start with what we can do first and give ourselves permission to work our way up to this goal.    Who isn't ready for exercise? Well, if you get severe dizziness/palpitations, chest pain or short of breath/faint with even minimal activity like walking across a room or you're having to pause while going up a flight of stairs, then getting your heart and/or lungs fully evaluated prior to starting an exercise regimen is recommended. Everyone else can probably start a program, but everyone may start at a different point:  Some can set a 5-10 minute walking goal and others will be able to ride their bike for an hour.      Start with where you're at and look to add 10% more each week until you're at that 150 minutes or more a week (or 75 minutes/week or more of vigorous exercise). Moderate exercise can be estimated as the pace you can carry on a conversation and vigorous is the pace at which you can get 3-5 words out before having to take a breath.  If you're using heart rate monitoring, Moderate is about 60% of your maximum heart rate and vigorous about 75%. (Max heart rate estimated as 220 beats minus your age:  Example: 220-age of 45 =176 Beats per minute (BPM) maximum. 0.6X 176= 105 BPM (moderate), 132 BMP(vigorous)).    If you like to count steps, the 10,000 steps per day does correlate well with weight maintenance but try to make at least 20-25% of those steps at a brisk pace (like you are about to  miss your bus).    If you like to use Apps on the phone, the couch to 5k nikhil and 7 minute workout apps are nice places to start if you are reasonably healthy.  There are hundreds of other options out there.  Consider viewing YouTube if gentler exercise/movement is desired. Videos on [employer] and chair yoga for seniors exist and are free. Check them out and let's get that 3-4 days a week routine going.    Let's move!  Kalia Rojas MD.       Topiramate for weight loss:    Topiramate (Topamax) is used to prevent seizures and migraines. Although it's not currently FDA approved for weight loss, it has been used safely for a number of years to help people lose weight.    It seems to work on areas of the brain to quiet signals related to eating and decreases appetite and cravings.    Topiramate may make some people feel:  -less interested in eating between meals.  -think less about food/eating.  -easier to push the plate away.  -giving up soda pop easier (generally makes it taste bad).  -have more control with portions.    How to take it:    1. Start at bedtime: one tablet, 25mg, nightly for a week.  2. Increase to 2 tablets (50mg) week 2 each night.  3. Increase to 3 tablets (75mg) the 3rd week and stay on this dose until follow up if tolerated. Back off to next lower dose if not tolerating it. Discontinue if rash/mood swing/dizziness or if feeling unwell.     Don't stop taking it if you don't think it's doing anything. It's effect can be subtle for some.    If prolonged use for months, we recommend tapering down over several weeks to reduce the risk of withdrawal/seizures.    Do not take with sleeping pills.    Higher doses are associated with more sedation/confusion/forgetfulness so we try to limit those side effects by keeping the dose to 75mg twice daily maximum in most cases.    Potential Side Effects:  The most common side effects are:  -Tingling in the hands/feet or face.  -change in concentration.  -attention  difficulty  -depression.  -increased body temperature.  -decreased sweating.  -increase risk of kidney stones.  -feeling sleepy/sedated tends to improve after a short time of use.    How to Store Topiramate (Topamax):  -Store in closed container at room temp away from heat/moisture.  -Keep out of the reach of children and pets.      When should I call my medical weight management team?  Call right away if yo notice any of the followin. Memory/speech problems.  2. Confusion/word finding difficulty (about 10% risk).   3. Change in vision as some glaucoma sx may worsen.  4. Nausea/vomiting feeling unwell for unclear reasons.    Contact call center if questions:  [phone].    What should I know before taking this medication?    1. Topiramate works best when you help it work:   -Decrease temptations around the house.   -stay away from situations that may trigger you.

## 2021-06-05 NOTE — TELEPHONE ENCOUNTER
Patient called to confirm whether she needed naltrexone refill and she does not. Still planning to taper off and start topamax once she returns from [place]. Thus, refill denial sent back to [facility] who triggered auto refill request.  Kalia Rojas MD

## 2021-06-12 ENCOUNTER — HEALTH MAINTENANCE LETTER (OUTPATIENT)
Age: 66
End: 2021-06-12

## 2021-06-16 PROBLEM — E66.812 OBESITY, CLASS II, BMI 35-39.9: Status: ACTIVE | Noted: 2019-05-22

## 2021-06-16 PROBLEM — F50.814: Status: ACTIVE | Noted: 2019-01-31

## 2021-06-21 NOTE — PROGRESS NOTES
Non-surgical Weight Loss Initial Diet Evaluation     Assessment:  Pt is a 63 y.o. female being seen today for non-surgical RD nutritional evaluation. Today we reviewed current eating habits and level of physical activity, and instructed on the changes that are required for successful weight loss outcomes.        Personal Goals: not lost weight after having children, tried different diets- weight watchers, [place] program, dx of binge eating disorder, hx phentramine- lost 30lb, weight is taking toll on health, GERD- hiatal hernia repair  Personal goal weight: 150lb     Pt Active Problem List Diagnosis:  There is no problem list on file for this patient.      Bupropion/Naltrexone: taking    Pt's weight: 227lb 14.4oz (103.4kg)  BMI: 42.02  IBW: 105-110 lbs    Estimated RMR ([place]-St Jeor equation): 1545 calories  Protein requirements (.5grams to .9grams per pound IBW, 20-30% of calories, minimum of 60-80gm per day):  53-94 grams     Food allergies, intolerances, Restorationism customs: none      Vitamins/Mineral Supplementation: vitamin D d/t deficiency    Biggest struggle with weight loss: binge eating disorder, large portions, grew up around food    Who does the grocery shopping for your household? Self and   Who prepares your meals at home? self    Diet Recall/Time: up until midnight, wake up 9am  Breakfast: 10:30a 1 egg omelet with cheese stick and hawkins and onion, english muffin with peanut butter, pear (19g)  Am Snack: none  Lunch: 1 cup ham and asparagus soup and chopped salad, popover  Pm snack: none  Dinner: split chicken hawkins salad, bread  HS Snack: none    Protein: 40-50grams    Typical Snacks: carrots, celery, string cheese    Fried Foods: no times per week    Meals per week away from home: 1-2 at sit down restaurants     Recommended limiting eating out to no more than 2x/week.  Patient and I reviewed the importance of eating three consistent meals per day; as well as meal timing to be spaced 4-5 hours  apart.  Snack choices: 100-150 calories (1-2x/day if physically hungry), incorporating a fruit/vegetable w/ protein source.    Portion Sizes problematic? yes per patient/diet recall  Encouraged slowing meal times down, 20-30 minutes, chewing to applesauce consistency.   To aid in proper portion control and slow meal time down discussed consuming meals off smaller plates, use toddler/children utensils and set utensils down after each bite.    Protein, vegetables/fruits, carbohydrates: patient not getting enough protein throughout the day  Reviewed lean protein sources today. Recommended consuming 20-30gm protein at 3 meals daily.  The patient and I discussed the importance of including lean/low fat protein at each meal and limiting carbohydrate intake to less than 25% of plate volume.     Beverages (Type/Oz. per day)  Water: 60-70oz  Coffee: decaf coffee per day  Tea: hot herbal at night  Alcohol: prior to medicine 1-2 per week    Discussed the importance of adequate hydration and the goal of 64+ oz of fluid daily.   The patient understands the importance of avoiding all alcoholic and sweetened drinks, and instead choosing 64 oz plain water.    Exercise  Type: yoga/stretching- exercise bands, pm- gazelle and bike  Frequency (days/week): 5  Duration (minutes/day): 30-40min    Pt's understands that 45-60 minutes of daily activity is an important part of weight loss success.   Encouraged pt to incorporate upper body strength training exercise, even if its lifting soup cans while watching tv at night, doing push ups/sit-ups, and abdominal work.    PES statement:    1. (NI-1.3)Excessive energy intake related to Food and nutrition related knowledge deficit concerning excessive energy/oral intake as evidenced by Intake of high caloric density foods; large portions; Estimated intake that exceeds estimated daily energy intake; Binge eating patterns; and BMI 42.02       Intervention  Discussion:  1. Educated pt on Eat Better,  Move More, Live Well: Non-surgical Weight Loss Handout  2. Recommended to consume 20-30gm protein at 3 meals daily. 70 grams daily total.  3. Educated pt on food labels: keeping total sugar grams <10  4. Plate Method: The patient and I discussed the importance of including lean/low  fat protein at each meal and limiting carbohydrate intake to less  than 25% of plate volume.  Instructions/Goals:   1. Include 20-30gm protein at each meal.  2. Increase vegetable/fruit intake, by having a vegetable or fruit with each meal daily. Recommended pt to increase vegetable/fruit intake to 4-5 servings daily.  3. Read food labels more consistently: keeping total fat grams <10, total sugar grams <10, fiber >3gm per serving.  4. Practice plate method: 1/2 plate lean/low fat protein source, vegetable/fruit, <25% of plate complex carbohydrates.  5. Practice eating off of smaller plates/bowls, chewing to applesauce consistency, taking 20-30 minutes to eat in a calm/relaxed environment without distractions of tv/email/cell phone.    Personal Pt. Goals  1. Eat fish 1x per week.  2. Change out butter and margarine for heart healthy fats.  3. Ensure that she is eating 20-30g protein at breakfast.    Handouts Provided:  Eat Better, Move More, Live Well: Non-surgical Weight Loss Handout  Plate Method  Protein Supplement List    Monitor/Evaluation:    Pt will f/u in one month with bariatrician, and f/u in two months with RD.    Plan for next visit with RD:  Mindful eating and emotional eating      Time In: 1:00p  Time Out: 2:00p      ABN signed: Yes    Visit completed and documented by Lacy Emmanuel RD, LD

## 2021-06-21 NOTE — PROGRESS NOTES
[facility]:  Non-Surgical Weight Loss Intake Appointment   Date of visit: 10/25/2018  Physician: Kalia Rojas MD  Primary Care is Sowmya Almonte MD.  Ada Palmer   63 y.o.  female  Ada is a 63 y.o. year old female who is here today for consultation regarding non-surgical weight loss.   she was referred to the [facility] by self referral.  She made today's visit for a second opinion regarding bariatric surgery and hiatal hernia repair.  She has misgivings about surgery given her binge eating in the past and fears she might injure herself following surgery if she has one of those binges.  I've reviewed her visit at the [facility] program with Dr. Summers and agree with his assessment that, should her mental health/compulsive eating be controlled, she'd benefit mostly from the combined bariatric surgery/hiatal hernia repair combination.  Unfortunately, she feels she can't comply with that lifestyle and so we then shifted focus to a non surgical approach to her weight loss needs, understanding the probability of achieving a 70-90 lb weight reduction is less likely with non surgical means.      She was interested in a trial of weight loss medication today, is already doing wonderful exercise regimens in her recently retired life and open to working with our dieticians on proper portion control/intake goals.   She was not interested in a meal replacement or health coaching/cooking program that we've recently started offering.    Weight History:   Wt Readings from Last 3 Encounters:   10/25/18 (!) 229 lb (103.9 kg)   08/28/17 (!) 230 lb (104.3 kg)     Body mass index is 42.22 kg/(m^2).       Assessment and Plan   Assessment: Ada Palmer is a 63 y.o.,female presenting for assistance with medical weight loss.  Identified issues contributing to her excess weight include:     She has a tendency towards later nights, sleeping in and delaying breakfast until nearly noon,  skipping lunch as a result and then grazing/snacking more often in the late afternoon and evening along with trouble controlling portion sizes for her size with supper.     She is walking regularly, has fitness equipment at home and doing some foot PT w/ exercise bands recently.    She may experience decreased GERD/hiatal hernia sx w/ change in diet/weight loss.   Due to prolonged PPI therapy, she is at risk for some low B12 issues and we'll check those levels today.    She had some labs done recently that showed excellent lipids, thyroid function and CMP.       Plan:  1.  Behavior Goals: 3 daily meals plan, ideally with a large breakfast, medium lunch        and smaller dinner. Avoidance of sugared-sweetened beverages and processed        carbohydrate snacks.  Work towards pre-planning meals to avoid falling back into old             habits/obesogenic habits.  Daily Food Tracking and morning weight recommended.  Weekly       check-in through MyChart to increase compliance.    2.  Diet Goals: Recommend getting first meal within 90 minutes of waking, 20-25 g/ protein per meal to start until she sees our dieticians and avoid sugary beverages/limit her fruits to 2per day.      3.  Exercise/Activity Goals: continue excellent strength and aerobic activity.  Long term goal of increasing endurance to allow for at least            200 minutes weekly of moderate exercise to maintain weight loss.      4.  Recommendation regarding weight loss medication:  Trial of bupropion/naltrexone.  She had allergic reaction to phentermine in the past w/ facial swelling so we'll avoid going use in her. Would also be appropriate for Belviq/topamax/liraglutide if she doesn't tolerate essentially Contrave.    5.  Referral to Dietician for further education and customization of diet plan.    6.  Stress Reduction: retired now.    7.  Intake Labs:  Reviewed recent labs and ordered a few additional studies.    8.   GERD: on ppi: check b12 levels.  Weight loss/diet change should improve somewhat.  Managed by her GI and PCP physicians.    9. Compulsive overeating in the past, states she didn't complete the [facility] in the past, worries about bariatric surgery as a result.    10. Low D in the past, she's using some vitamin D will check D and PTH levels to adjust as needed.    There are no diagnoses linked to this encounter.     No Follow-up on file.     Weight and Lifestyle History    Sleep history:  Goes to bed midnight (night owl). Gets up once to use bathroom. Never feels refreshed. Dark/cool bedroom. Wakes up now (just retired) around 9am.  Once awake, will do a half hour of exercise:  Foot PT/bands/weight (treadmill, riding bike, gazelle skier). Afterwards, checks phone/social media has breakfast at 11am:  Omelet w/ toast and coffee/juice or cereal/fruit/coffee/ yogurt.   If has to head out may grab a granola bar. Afternoons are adrift currently will often skip lunch and snack until supper:  Cookies/crackers. Carb consumer. fritos is a binge food for her can go through a whole bag. May do house work, take dog for walk in [facility] if weather is nice. May do this 3-4 times weekly most weeks including winter.  Supper is around 6pm: meat w/ potato and rice w/ veggie and fruit.  Time to cook now so making bigger suppers. Son living w/ her now after a bad breakup.  Evenings are spent cleaning up, laundry, watches some TV w/  and does some computer work email/social media.  Likes to read everynight.    Hours per night: see above  Snoring/apnea/insomnia? Occasional snoring.  Restful/refreshed? Not usually, just retired.  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 no  Sleep study previously? no  TV in bedroom? no  Sleep aids/pills? no      STOP-BANG score for sleep apnea : 2  For general population   TANIA - Low Risk : Yes to 0 - 2 questions  TANIA - Intermediate Risk : Yes to 3 - 4 questions  TANIA - High Risk : Yes to 5 - 8  questions  or Yes to 2 or more of 4 STOP questions + male gender  or Yes to 2 or more of 4 STOP questions + BMI > 35kg/m2  or Yes to 2 or more of 4 STOP questions + neck circumference 17 inches / 43cm in male or 16 inches / 41cm in female    Weight History:  In what way is your excess weight affecting your wellness/health? GERD/hiatal hernia. Can still play w/ grandkis  Heaviest weight: na  Any Previous weight loss, what was the most lost and method: na  Birth weIight, High School graduation weight: na  Lowest adult weight: na  Maternal health/smoking/weight: na  When did you start gaining weight and what were the circumstances? na  Is anyone else in your immediate family overweight? na  Finally,  Is there a weight you desire to be, what is your goal weight that would define successful weight loss for you? na   I would like to lose weight so I can  :  Enjoy penitentiary   .     Barriers to weight loss:  Is anyone else at home/work/family a barrier to your weight loss? See above  Work schedule/location? retired  Current stressors include: son living back with them  Mobility problems: no    Counseled on health benefits of weight loss, goals for metabolic/diabetic risk reduction, HTN reduction, improved sleep and mobility, cancer reduction, longevity.    Fitness History:  Were you ever an athlete?walks regularly     Which sports?   When was the last time you walked/hiked a mile?regularly w/ dog in park.  Do you have any limitations for activity?no  Do you have access to a gym, pool, club, fitness center, or ?yes, home fitness equip.                Do you have any fitness goals/dreams that could motivate your weight loss?no    On a scale from 0-10,  how willing are you to start some sort of movement/exercise regimen? Already doing 3-4 days weekly.    Counseled on physiologic benefits of exercise and for long term weight maintenance, goal working towards 200-300 minutes weekly.     Food History:  Who buys groceries?  " shared  Do you eat at dinner table, is the TV on or off, family present? na  Any soda, how much? None, juice 1-2x daily OJ.  Meals per day? See abov,   Snacks per day? See above, afternoon/evenings/  Breakfast typically is: see above  Lunch typically is: skipped  Dinner  is: see above  Weekly Fast Food/dining out: likes to go out with friends regularly  Snacks consist of: popcorn/sweets/cookies/crackers/carbs    Food sensitivities/allergies/intolerances/avoidances: na  Water per day? \"lots\".     Any binging behavior?yes  Any induced vomiting/purging? no  Any history of anorexia/bulimia? no  Any night eating issues? no  Stress induced eating? some    Goal Setting:  Short Term Goals 10% weight loss goal is getting under 227 lbs.  Long Term Goals under 170 lbs to start.         Patient Profile   Social History     Social History Narrative     Family History   Problem Relation Age of Onset     Breast cancer Paternal Aunt      no idea of age     Breast cancer Cousin 55     Hypertension Mother      Obesity Mother      Thyroid cancer Mother      Arthritis Father      Skin cancer Father      Obesity Sister      Hypertension Brother      Sleep apnea Brother      Obesity Sister      Obesity Sister           Past Medical History   There is no problem list on file for this patient.    Phentermine; Cefaclor; Ragweed pollen; Squaric acid; and Sulfa (sulfonamide antibiotics)  Current Outpatient Prescriptions   Medication Sig Note     cholecalciferol, vitamin D3, 1,000 unit tablet Take 1,000 Units by mouth daily.      crisaborole (EUCRISA) 2 % Oint DARCY EXT AA BID 10/25/2018: Received from: [facility]     fluticasone (FLONASE) 50 mcg/actuation nasal spray 2 sprays into each nostril daily. 10/25/2018: Received from: [facility] Received Sig: Place 2 Sprays into both nostrils daily at bedtime.     omega-3/dha/epa/fish oil (FISH OIL-OMEGA-3 FATTY ACIDS) 300-1,000 mg capsule Take 2 g by mouth daily. " "     pantoprazole (PROTONIX) 40 MG tablet Take 40 mg by mouth daily. 10/25/2018: Received from: [facility] Received Sig: TK 1 T PO  D       Past Surgical History  She has a past surgical history that includes Tubal ligation and Hand surgery (Right).     Examination   /73 (Patient Site: Right Arm, Patient Position: Sitting, Cuff Size: Adult Large)  Pulse 79  Ht 5' 1.75\" (1.568 m)  Wt (!) 229 lb (103.9 kg)  LMP 10/03/2009  Breastfeeding? No  BMI 42.22 kg/m2  Height: 5' 1.75\" (1.568 m) (10/25/2018 10:43 AM)  Weight: 229 lb (103.9 kg) (10/25/2018 10:43 AM)  BMI (Calculated): 42.2 (10/25/2018 10:43 AM)  General:  Alert and ambulatory, mallamapati 2 airway  HEENT:  No conjunctival pallor, moist mucous Membranes, neck is thick. No nodules palpable.  Pulmonary:  Normal respiratory effort, no cough, no audible wheezes/crackles.  CV:  Regular rate and Rhythm, no murmurs, pulses 2 plus radial  Abdominal: obese, few scattered telangectasias/petchiae seen, states these are chronic/lifelong and run in her family.   Extremities: no tremor, no edema.  Skin:  No pallor or jaundice  Pscyh/Mood:  Motivated for change but fearful of surgery.                                    LABS: \"Reviewed the recent labs in [facility] and ordered a few additional studies that she plans to get done today.    Last recorded labs include:  Lab Results   Component Value Date    WBC 7.5 08/28/2017    HGB 13.1 08/28/2017    HCT 40.2 08/28/2017    MCV 84 08/28/2017     08/28/2017      No results found for: [id number] No results found for: HGBA1C   Lab Results   Component Value Date    CHOL 174 07/11/2018    No results found for: PTH      No results found for: FERRITIN   Lab Results   Component Value Date    HDL 55 07/11/2018      No results found for: [id number] No results found for: 20878   Lab Results   Component Value Date    LDLCALC 103 07/11/2018    Lab Results   Component Value Date    TSH 1.33 " 2018    No results found for: FOLATE   Lab Results   Component Value Date    TRIG 79 2018    Lab Results   Component Value Date    ALT 27 2018    AST 22 2018    ALKPHOS 77 2018    BILITOT 0.4 2018    No results found for: TESTOSTERONE     No components found for: CHOLHDL No results found for: 7597   @resusfast(vitamin a: 1)@       Other Notables/imaging:     Counselin minutes spent in direct consultation with the patient regarding conditions contributing to excess weight accumulation, with over 50% of the time spent in counseling, goal setting and initiating a plan to lose their excess weight.    Kalia Rojas MD  [facility].  10/25/2018

## 2021-06-22 ENCOUNTER — RECORDS - HEALTHEAST (OUTPATIENT)
Dept: LAB | Facility: CLINIC | Age: 66
End: 2021-06-22

## 2021-06-22 LAB
C DIFF TOX B STL QL: NEGATIVE
RIBOTYPE 027/NAP1/BI: NORMAL

## 2021-06-22 NOTE — PROGRESS NOTES
"Bariatric Clinic Follow-Up Visit:    Ada Palmer is a 63 y.o.  female with Body mass index is 41.56 kg/m .  presenting here today for follow-up on non-surgical efforts for weight loss. Original Intake visit occurred on 10/25/18  with a weight of 229 lbs and BMI of 42.2.  Along with diet and behavior changes, she has been using bupropion/naltrexone to assist her weight loss goals.  See her intake visit notes for details on identified contributors to weight gain in the past.    Weight:   Wt Readings from Last 2 Encounters:   12/05/18 (!) 225 lb 6.4 oz (102.2 kg)   10/31/18 (!) 227 lb 14.4 oz (103.4 kg)    pounds  Height: 5' 1.75\" (1.568 m) (12/5/2018  1:03 PM)  Weight: (!) 225 lb 6.4 oz (102.2 kg) (12/5/2018  1:03 PM)  BMI (Calculated): 41.6 (12/5/2018  1:03 PM)  SpO2: 96 % (12/5/2018  1:03 PM)      Comorbidities:There is no problem list on file for this patient.      Current Outpatient Medications:      buPROPion (WELLBUTRIN) 75 MG tablet, Start one tablet with breakfast for 10 days then increase to twice daily with breakfast and supper if tolerated., Disp: 90 tablet, Rfl: 1     cholecalciferol, vitamin D3, (VITAMIN D3) 5,000 unit Tab, Take 1 tablet (5,000 Units total) by mouth daily., Disp: 100 tablet, Rfl: 2     crisaborole (EUCRISA) 2 % Oint, DARCY EXT AA BID, Disp: , Rfl:      fluticasone (FLONASE) 50 mcg/actuation nasal spray, 2 sprays into each nostril daily., Disp: , Rfl:      naltrexone (DEPADE) 50 mg tablet, Start half tab daily for 10 days then increase to half tab twice daily (breakfast and supper)., Disp: 60 tablet, Rfl: 0     omega-3/dha/epa/fish oil (FISH OIL-OMEGA-3 FATTY ACIDS) 300-1,000 mg capsule, Take 2 g by mouth daily., Disp: , Rfl:      pantoprazole (PROTONIX) 40 MG tablet, Take 40 mg by mouth daily., Disp: , Rfl:       Interim: Since our last visit, she has dropped additional weight     Plan:   1. 1. Great work on continued regular weight loss.  Continue to aim for 60-80 grams per day of " protein, about 20 grams per meal and aiming for about 5347-9995 kcals per day given your resting metabolic rate of just over 1500 kcal per day.      2. Continue excellent journaling. Aiming to increase exercise up ideally between 200-300 minutes a week (over 260 minutes weekly corresponds with additional weight loss). Adding 10% each week to your activity helps get you there without injury.    3. Continue bupropion/naltrexone. Vitamin D3 should be 5000IUs daily and recheck early Summer to see if PTH has corrected as expected.    4. Caution on regular evening snacks, trying to keep the evening meal as your last food is a good target and sticking with water in the evening helps as long as you're getting enough protein throughout your 3 meals.          We discussed [facility] Bariatric Basics including:  -eating 3 meals daily  -eating protein first  -eating slowly, chewing food well  -avoiding/limiting calorie containing beverages  -We discussed the importance of restorative sleep and stress management in maintaining a healthy weight.  -We discussed the National Weight Control Registry healthy weight maintenance strategies and ways to optimize metabolism.  -We discussed the importance of physical activity including cardiovascular and strength training in maintaining a healthier weight and explored viable options.    Most recent labs:  Lab Results   Component Value Date    WBC 6.3 10/25/2018    HGB 14.1 10/25/2018    HCT 41.4 10/25/2018    MCV 83 10/25/2018     10/25/2018     Lab Results   Component Value Date    CHOL 174 07/11/2018     Lab Results   Component Value Date    HDL 55 07/11/2018     Lab Results   Component Value Date    LDLCALC 103 07/11/2018     Lab Results   Component Value Date    TRIG 79 07/11/2018     No components found for: CHOLHDL  Lab Results   Component Value Date    ALT 27 07/11/2018    AST 22 07/11/2018    ALKPHOS 77 07/11/2018    BILITOT 0.4 07/11/2018     No results found for:  "HGBA1C  Lab Results   Component Value Date    [id number] 528 10/25/2018     Lab Results   Component Value Date    DPCPFOUJ16JZ 26.7 (L) 10/25/2018     No results found for: FERRITIN  Lab Results   Component Value Date     (H) 10/25/2018     No results found for: 06963  No results found for: 7597  Lab Results   Component Value Date    TSH 1.33 07/11/2018     No results found for: TESTOSTERONE    DIETARY HISTORY    Positive Changes Since Last Visit: more consistent  Struggling With: exercise/some evening snacks still    Knowledgeable in Reading Food Labels: improving  Getting Adequate Protein: improving  Sleeping 7-8 hours/day doing better  Stress management better    PHYSICAL ACTIVITY PATTERNS:  Cardiovascular: walking some  Strength Training: encouraged to start up more    REVIEW OF SYSTEMS  GENERAL/CONSTITUTIONAL:  nl  HEENT:   nl  CARDIOVASCULAR:   nl  PULMONARY:   nl  GASTROINTESTINAL:  nl  UROLOGIC:  nl  NEUROLOGIC:  nl  PSYCHIATRIC:  nl  MUSCULOSKELETAL/RHEUMATOLOGIC   nl  ENDOCRINE:  nl  DERMATOLOGIC:  nl    PHYSICAL EXAM:  Vitals: /70 (Patient Site: Right Arm, Patient Position: Sitting, Cuff Size: Adult Large)   Pulse 79   Ht 5' 1.75\" (1.568 m)   Wt (!) 225 lb 6.4 oz (102.2 kg)   LMP 10/03/2009   SpO2 96%   Breastfeeding? No   BMI 41.56 kg/m    Height: 5' 1.75\" (1.568 m) (12/5/2018  1:03 PM)  Weight: (!) 225 lb 6.4 oz (102.2 kg) (12/5/2018  1:03 PM)  BMI (Calculated): 41.6 (12/5/2018  1:03 PM)  SpO2: 96 % (12/5/2018  1:03 PM)      GEN: Pleasant, well groomed, in no acute distress  HEENT: PEERL, EOMI, airway clear .  NECK: No swelling.  HEART: Rhythm regular, rate regular, no murmur   LUNGS: Clear without crackles or wheezes. No cough.  ABDOMEN: obese..  EXTREMITIES: no tremor .  NEURO: Alert and Oriented X3, normal gait and speech..  SKIN: No visible rashes.        25 minutes was spent in direct consultation, with over 50% of it spent in counseling regarding their plan for excess weight " reduction and health modification.  Kalia Rojas MD  [facility]  1:23 PM

## 2021-06-22 NOTE — PROGRESS NOTES
Non-surgical Weight Loss Follow Up Diet Evaluation    Assessment:  This patient is a 63 y.o. female is being seen today for follow-up non-surgical nutritional evaluation. Today we reviewed the patients current eating habits and level of physical activity, and instructed on the changes that are required for successful weight loss outcomes.    Had been faithful about tracking food, but lost that over the holidays. Got a Fitbit for [place], setting alarms for sleep.    Buproprion/Naltrexone: Taking    Pt's Initial Weight: 229 lbs  Weight: (!) 224 lb 8 oz (101.8 kg)  Weight loss from initial: 4.5  % Weight loss: 1.97 %    BMI: Body mass index is 41.39 kg/m .  IBW: 105-110 lbs    Personal goal weight: 150     Pt Active Problem List Diagnosis:    There is no problem list on file for this patient.      Estimated RMR ([place]-St Jeor equation): 1536 calories  Protein requirements (.5grams to .9grams per pound IBW, 20-30% of calories, minimum of 60-80gm per day):  53-94 grams    Progress made since last visit: trying to eat three meals per day  Concerns: intake of higher fat meats- hawkins 1-2 times per week, not eating very much vegetables, night-time eating      Diet Recall/Time: waking up at 9am, then stretching/yoga  Breakfast: 10a- piece of toast with peanut butter- typical is oatmeal with raisins and cinnamon, yogurt with nuts- cheerios with berries and 1/2 cup milk, egg every other day  Am Snack: none  Lunch: tuna with bustillo and pickles on a piece of toast, deli ham on slice of bread and piece of fruit  Pm snack:none  Dinner: protein- round steak or pork chop, 1-2 cups vegetable at night, baked potato or rice- sometimes fruit   HS Snack: sweets- oreos, candy, trail mix, popcorn    Protein: 60-70g    Typical Snacks: crackers- if hungry  Meals per week away from home: 2x- sit down     Recommended limiting eating out to no more than 2x/week.  Patient and I reviewed the importance of eating three consistent meals per day;  as well as meal timing to be spaced 4-5 hours apart.  Snack choices: 100-150 calories (1-2x/day if physically hungry), incorporating a fruit/vegetable w/ protein source.    Portion Sizes problematic? YES per patient/diet recall  Encouraged slowing meal times down, 20-30 minutes, chewing to applesauce consistency.   To aid in proper portion control and slow meal time down discussed consuming meals off smaller plates, use toddler/children utensils and set utensils down after each bite.    Protein, vegetables/fruits, carbohydrates: patient reports not feeling hungry enough to eat lunch at times, leading to overeating in the evening. Encouraged balanced meals and developing a schedule.    The patient and I discussed the importance of including lean/low fat protein at each meal and limiting carbohydrate intake to less than 25% of plate volume.       Vitamins/Mineral Supplementation:Vit D and fish oil    Beverages (Type/Oz. per day)  Water: 64-80oz   Coffee: decaf  Tea: none  Milk: 1 cup for supper and with cereal  Regular soda: none  Diet soda: none  Juice: none  Isauro-Aid/lemonade/etc: none  Alcohol: occasional glass of wine    Discussed the importance of adequate hydration and the goal of 64+ oz of fluid daily.   The patient understands the importance of  avoiding all sweetened and alcoholic drinks, and instead choosing 64 oz plain water.    Exercise  stretching/Yoga  Daily exercise  Treadmill walking a couple times per week  Pickleball on Fridays    Pt's understands that 45-60 minutes of daily activity is an important part of weight loss success.   Encouraged pt to incorporate  strength training exercise in addition to cardiovascular exercise most days of the week.    PES statement:     1. (NI-1.3)Excessive energy intake related to Not ready for diet/lifestyle change as evidenced by Intake of high caloric density foods at meals and/or snacks; large portion; frequent grazing; Estimated intake that exceeds estimated  daily energy intake; Binge eating patterns; Frequent excessive fast food or restaurant intake; and BMI 41.39       Intervention:  Discussion:  1. Plate Method: The patient and I discussed the importance of including lean/low  fat protein at each meal and limiting carbohydrate intake to less  than 25% of plate volume.  Instructions/Goals:   1. Include 20-30gm protein at each meal.  2. Increase vegetable/fruit intake, by having a vegetable or fruit with each meal daily. Recommended pt to increase vegetable/fruit intake to 4-5 servings daily.  3. Incorporate daily structured activity, 45-60 minutes most days of the week  4. Practice plate method: 1/2 plate lean/low fat protein source, vegetable/fruit, <25% of plate complex carbohydrates.  5. Eat fish at least once weekly  6. Exercise 20 min 5 days per week  7. Try a protein shake or smoothie for lunch    Handouts Provided:  No handouts provided at this time.    Monitor/Evaluation:    Pt will f/u in one month with bariatrician, and f/u in two months with RD.    Plan for next visit with RD:  Review plate method  Educated pt on food labels  Exercise  Mindful eating and snacking    Time In: 1:30p  Time Out: 2:00p      ABN signed: Yes

## 2021-06-23 NOTE — PATIENT INSTRUCTIONS - HE
Weight Loss Jump Start Plan      The Jump Start Plan is a great way to quickly lose a few pounds and eliminate sugar cravings before beginning a long-term weight loss plan. It will help you get used to eating more protein and drinking more water, and it helps you reintroduce vegetables and enjoy their taste again. Plus it s very restrictive so once you start your long-term weight loss plan  following your new diet will seem easier and more generous in comparison! This 3 day Jump Start can be done before beginning a weight loss plan to drop a few pounds and also anytime throughout your diet to break out of a plateau.       THE JUMP START WEIGHT LOSS DIET:    Unlimited Protein. Choose from:    Roasted/sautéed/baked/grilled chicken breasts (skinless is ideal to start with but skin-on is fine if you use this for only one of your protein servings daily)    Venison    Canned tuna    Fish (broiled or baked)    Steak (filet mignon or sirloin ideally)    Shrimp (sautéed, grilled)    At least one egg daily but not more than 2 eggs per day    It can be helpful to roast several chicken breasts the night before beginning the Jump Start and then eat them throughout the day, just re-warming as needed.  Note: Vegetarians can use an unflavored protein powder in a vegetable smoothie or juice. Unlimited tofu, lentils and beans may also be used for the protein source for vegetarians.    Unlimited, RAW Vegetables, but NO carrots or corn.  Choose from:    Lettuce    Green beans    Peas    Cucumbers    Celery    Bell peppers (red/green/yellow)    Zucchini    Cauliflower    Tomatoes (limit to 5 cherry tomatoes or one regular tomato daily)      This helps cut sweet cravings and helps keep the stools soft and gets you your vitamins.  Try to keep veggies raw during these 2 days. After Jump Start is complete, cook as many veggies as you can, just not during this phase.      Water intake:  Mandatory minimum of 64 oz of water each day,  with a maximum of 100 oz (unless you re sweating a lot, in which case drink more).  A 10-12 oz glass of water every 3 hours when awake works best.    Juice requirement: Juice is not recommended during the long term weight loss plan but for this Jump Start three-day portion you should drink four, 6 oz. glasses of 100% pineapple juice or 100% layo juice per day, no more, no less (24 oz per day total).  Please measure carefully!  Drink the juice when you eat protein or veggies so your blood sugar doesn t spike as much. Your energy levels will also be much better throughout the 2 days.    Coffee/Tea: You can have up to 2 cups of coffee or tea daily.      No alcohol during this phase.  If you are dependent on alcohol or have had withdrawal before, notify your practitioner before starting the Jump Start.    Fresh fruits, up to two servings per day. Choose from:    One apple    20 grapes    One nectarine    One tangerine    One peach    15 cherries    3 oz of fresh pineapple    One slice of cantaloupe    1 inch wide slice of watermelon    If craving occurs for sweets, have another glass of water and some veggies or protein.    Condiments are fine in moderation. Choose from:    A little olive oil or butter to cook your proteins    A dash of salt (try to limit)    Pepper    Garlic    Seasoning herbs    Mustard    Ketchup (no more than 1 Tablespoon daily)    Mayonnaise (max 2 Tablespoons daily)      Season your proteins so they taste good, just watch salt intake.  No jelly or peanut butter unless peanut butter is just crushed peanuts in the quantity listed in the Nuts section.    Nuts, up to one serving/1 oz. daily. Choose from:    15 Almonds    24 peanuts (unflavored/unsalted)    14 halves of walnuts    40 pistachios    No bread during the Jump Start.      No dairy products during the Jump Start.        Strategies for Success:  1. Start the morning with protein (plan ahead!). The more the better.  Cook 6 chicken breasts or  put in crock put for pulled chicken over the day, etc.  Make it easy to grab the protein.    2. Get your water in, frequent small intake is better throughout the day.  3. If you feel severe hunger, start with a protein serving, followed by water and then a crunchy vegetable that requires chewing (this decreases the hunger hormone).  4. Schedule the juice intake like clockwork, and take it with protein, veggies or both to prevent blood sugar spikes and dips and prevent decreases in energy.  5. Record your intake on the sheet below.  Although this may be quite different from your usual diet, it s enough food!    6. Be intentional and think about what you are eating and feel the food fuel you.  7. Vigorous physical activity is not recommended during these 3 days but low intensity exercise less than 30 minutes (walking/hiking/yard work) is encouraged.  8. This often works best on the weekend when you can devote your full attention to complying with the diet and the food is readily available.  9. Try to get protein in at least every 3.5 hours during the day to avoid a hunger surge late in the day.  10.  Use plenty of olive oil when cooking eggs/meats; the fat will make you feel more satisfied.  Don t cook on high heat though as this transforms the olive oil into a less healthy oil.  11. Expect to urinate a lot over the 3 days.  Most of us have a lot of carbohydrate weight retention.  Remember, if you get the recommended water intake in you will be fully hydrated but still urinating a lot. Urine should be pale yellow and the urge should come every 3-4 hours, if not sooner, for portions of the three days.        Note: diabetic patients on insulin should check their blood sugars 4 times a day during this phase and record those values.  If low blood sugar occurs, rescue as you usually would with an extra serving of juice/fruit.        Three-Day Jump Start Food Checklist: [place] or check off after eating.    Protein Servings:   Unlimited!!! [place] how many each day.     Day 1:  1  2  3  4  5  6  7  8  9  10  ___________________________      Day 2:  1  2  3  4  5  6  7  8  9  10  ___________________________    Vegetables: RAW, Unlimited as noted above.  [place] servings.     Day 1:  1  2  3  4  5  6  7  8  9  10  ___________________________      Day 2:  1  2  3  4  5  6  7  8  9  10  ___________________________    Fruit:   Day 1:  Serving 1 was ____________________,  Serving 2 was:  ___________________     Day 2:  Serving 1 was ____________________,  Serving 2 was:  ___________________    Nuts:   Day 1 Serving was _________________________      Day 2 Serving was _________________________    Eggs:  At least one daily unless allergic, max of 2 daily. [place] servings.     Day 1:  1 egg          2 eggs     Day 2:  1 egg          2 eggs    Jump Start Juice:  Mandatory four servings daily, one serving is 6 oz. Measure carefully!     Pineapple or [place] ([place])     Day 1: 6 oz. 6 oz. 6 oz. 6 oz.     Day 2:  6 oz. 6 oz. 6 oz. 6 oz.    Water: Minimum of 6 glasses 10 oz. glasses daily (max of 11 glasses)     Day 1: 1st glass   2nd glass   3rd glass   4th glass   5th glass   6th glass _________________     Day 2: 1st glass   2nd glass   3rd glass   4th glass   5th glass   6th glass _________________      Starting weight:  Day 1 morning weight:  _______________________ lbs.   Day 2 morning weight:  _______________________ lbs.  Day 3 morning weight:  _______________________ lbs.  Ending weight:  Day 4 morning weight:  _______________________ lbs.            Plan:    1. Continue vitamin D and bupropion/naltrexone as it's agreeing with you. Discontinue naltrexone if any injury/accident or procedure than requires opiate pain medications (Vicodin/perocet/morphine/etc.).    2. Continue to aim for 3 meals daily to help curb cravings/potential for evening binges.  Aiming for 20 grams of protein 3 meals daily.  You could consider a meal replacement program  when you return from [place] if struggling.  Either the 800 or 1000 calorie program would be feasible for you.    3. Continue your excellent workout habit, try to increase your steps 10% per week with the intention of getting at least 20% of your steps at a brisk pace. Aiming for at least 2 days a week of some strength training will optimizing metabolic health.          Exercise Guidance    Nearly everything that bothers us gets better when the proper amount of exercise can be done in the proper amounts.  Getting to that level safely and without injury is the key.  When it comes to weight loss, exercise is especially important in maintaining the weight loss as one of the harsh realities is that weight loss slows our metabolism.  Our brain always remembers our heaviest weight and seeks to return us to that level as it doesn't understand the concept of having too much energy, only not having enough.  As such, when we lose weight, the brain thinks we're ill or in a famine and dials back our metabolism to limit further weight loss.  This is why exercise is so important in keeping the weight off and is the main reason people have some weight regain from their low weight point after weight loss.  We have to make up that 10-20% of calories not being burned and because we can restrict our diet only so much, exercise becomes very important in our long term healthy weigh maintenance.   Generally, for every 5% body weight reduction in their weight loss season, a person needs to add  kilocalories of exercise in their daily routine to keep that weight off for the long term.  This is why it's vital to be starting your fitness regimen during weight loss season, it becomes so vital for long term success in maintaining that weight loss.    Additionally, all sorts of good enzymes and genes turn on with exercise and our stress, sleep, mood and bodies feel better when we can get to the point of making ourselves a little sweaty  and short of breath 35-50 minutes most days of the week.    Who isn't ready for exercise? Well, if you get severe dizziness/palpitations, chest pain or short of breath/faint with even minimal activity like walking across a room or you're having to pause while going up a flight of stairs, then getting your heart and/or lungs fully evaluated prior to starting an exercise regimen is recommended. Everyone else can probably start a program, but everyone may start at a different point:  Some can set a 5-10 minute walking goal and others will be able to ride their bike for an hour.  Start with where you're at and look to add 10% more each week until you're at that 150 inutes or more a week goal.    If you like to count steps, the 10,000 steps per day does correlate well with weight maintenance but try to make at least 20-25% of those steps at a brisk pace (like you are about to miss your bus).        Bupropion/Naltrexone Patient Information (trade name, CONTRAVE)    This medication is used for weight loss.  In studies people lost an averaged 5-8% of their starting weight compared to placebo (0-1%).    Often, this medication will be written as 2 separate prescriptions to improve cost to the patient. The trade name drug CONTRAVE comes as a combination of 8mg naltrexone/90mg bupropion and a 3 week escalating dose regimen going from one tablet daily up to a max of 2 tabs twice daily:  Week 1: one tablet in the daytime.  Week 2: one tablet A.M.  And One tablet in the PM.  Week 3: 2 tabs AM  And One tablet in the PM.  Week 4: 2 tabs AM and 2 tabs PM.     The generic Rx I write for is as follows:  I recommend starting One 75 mg bupropion and 1/2 a tablet of naltrexone (25mg) daily for 10 days and then moving up to One 75 mg bupropion tablet twice daily and half a naltrexone tablet twice daily.  Depending on your results/tolerance, we may increase the Bupropion up to a maximum of 150 mg twice daily of the immediate release  medication or one Bupropion  mg-300 mg daily. Sometimes we'll have to go slower with the dose escalation.    Like any weight loss medication, showing results and having tolerable or no side effects is vital to continuing therapy and if either no significant weight loss after 8-12 weeks or too many side effects then we would discontinue therapy and look for alternative options/assistance.    AVOID taking with high fat meals as this increases bupropion levels, but some food in the stomach can help decrease nausea side effects from the Naltrexone.    People who take Metoprolol may need to decrease their dose of metoprolol as the bupropion increases the effect of metoprolol 2-4 fold in some cases and low blood pressure/low heart rate could occur.    Patients should STOP CONTRAVE if they have a severe allergic reaction to CONTRAVE.  Patients should be told that CONTRAVE should be discontinued and not restarted if they experience a seizure while on treatment.    Patients should be advised that the excessive use or abrupt discontinuation of alcohol,benzodiazepines, antiepileptic drugs, or sedatives/hypnotics can increase the risk of seizure.    Patients should be advised to minimize or avoid use of alcohol.    Patients should be advised that if they previously used opioids, they may be more sensitive to lower doses of opioids and at risk of accidental overdose should they use opioids after CONTRAVE treatment is discontinued or temporarily interrupted.  Patients should be advised that because naltrexone, a component of CONTRAVE, can block the effects of opioids, they will not perceive any effect if they attempt to self-administer anyopioid drug in small doses while on CONTRAVE. Further advise patients that the attempt to administer large doses of any opioid or to bypass the blockade while on CONTRAVE may lead to serious injury, coma, or death.    Patients should be off all opioids for a minimum of 7 to 10 days before  starting CONTRAVE in order to avoid precipitation of withdrawal. Advise patients they should not take CONTRAVE if they have any symptoms of opioid withdrawal.   Patients should be advised to call their healthcare provider if they experience increased blood pressure or heart rate.  Patients should be advised to notify their healthcare provider if they are taking, or plan to take, any prescription or over-the-counter drugs. Concern is warranted because CONTRAVE and other drugs may affect each other s metabolism.  Patients should be advised to notify their healthcare provider if they become pregnant, intend to become pregnant, or are breastfeeding during therapy.  CONTRAVE should NOT be taken if pregnant or nursing.    Patients with type 2 diabetes mellitus on antidiabetic therapy should be advised to monitor their blood glucose levels and report symptoms of hypoglycemia to their healthcare provider(s).    Patients should be advised to swallow CONTRAVE tablets whole so that the release rate is not altered. Do not chew, divide, or crush tablets if using the Trade drug Contrave, dividing the generic naltrexone is fine.    As always, if any questions/concerns, don't hesitate to call us at the [facility] clinic.    Kalia Rojas MD  [phone].  1/31/2019          On-the-Go Breakfast Ideas  As of 2015, the latest research shows what a huge impact eating breakfast has on losing weight and feeling your best. People lose more weight when they make breakfast their biggest meal of the day compared to Dinner, but even if you cannot go to that degree, getting a breakfast that has at least 20 grams of protein and even a moderate amount of fat is ideal for maintaining good energy through the day and limits overeating in the evening hours.  The following are some quick and easy suggestions for at least getting something of substance into your body in the morning.  Enjoy!    Eating breakfast within 90  minutes of waking up is an important part of taking care of your body.  After sleeping for hours, your body is in need of fuel.  An ideal breakfast is a combination of protein, whole-grain carbohydrates, or fruit.  Here s why:    -Protein digests very slowly in the body, helping you feel more satisfied.  -Whole grains provide dietary fiber, which also digests slowly and helps keep your gut clean.  -Fruit is a great source of vitamins, minerals, and fiber.      Each one of these breakfast combinations has between 200-300 calories and 15-20 grams of protein.  Feel free to mix and match!    Protein: Choose  -1/2 cup low-fat cottage cheese  -2 hard boiled eggs , or one cooked in olive oil (low/slow heat).  -1 low fat string cheese stick  -1 [employer] natural peanut butter  -Skypaz vegetarian sausage eva (found in freezer section)  -1 slice lowfat cheese  -6 oz 2% or lowfat Greek yogurt, such as Fage or Oikos.    PLUS    Whole Grains:  Choose   -1 whole wheat English muffin  -1 whole wheat renetta, half  -1/2 Fiber One frozen muffin, thawed  -1/2 Fiber One toaster pastry  -1 whole wheat bagel thin  -1/2 cup Kashi cereal  -1 Kashi waffle (or other whole grain high-fiber waffle)    OR    Fruit: Choose  -1/3 cup blueberries  -1/2 banana (or a plantain- similar to a banana, yet smaller)  -1/2 cup cantaloupe cubes  -1 small apple  -1 small orange  -1/2 cup strawberries    *Adapted from Diabetes Living, Fall 20    Ten Breakfasts Under 250 calories    Ideally, getting between 350-600 calories  (depending on starting height and weight)for breakfast is ideal for avoiding hunger later in the day, adjust/add to the following accordingly:    One- 250 calories, 8.5 g protein  1 slice whole-grain toast   1 Tbsp peanut butter    banana    Two- 250 calories, 8 g protein    cup nonfat/lowfat yogurt  1/3rd cup diced no-sugar peaches  1/3rd cup cereal (like Special K, Cheerios, or bran flakes)    Three- 250 calories, 25 g  protein  1 egg scrambled with 1 oz skim milk    cup shredded cheddar    whole grain English muffin  1 oz [place] hawkins  1 tsp margarine spread    Four- 225 calories, 25 g protein  1/2 cup Kashi Go-Lean cereal    cup skim milk mixed with 1 scoop Bariatric Advantage protein powder    cup no-sugar diced pears    Five- 250 calories, 20 g protein    cup oatmeal prepared with skim milk, 1 scoop protein powder, and sugar-free maple syrup    Six- 200 calories, 5 g protein  1 whole grain waffle, toasted  1 tablespoon creamy peanut or almond butter    Seven-  250 calories, 19 g protein  Breakfast sandwich: 1 slice whole grain toast, cut in half.  Add 1 scrambled egg and one slice cheddar  cheese.    Eight-  250 calories, 15 g protein  2 eggs scrambled with 1/3 cup frozen spinach (heat before adding to eggs) and 2 tablespoons low fat cream cheese.    Nine-  150 calories, 15 g protein  2/3rd cup cottage cheese    cup cantaloupe    Ten- 200 calories, 20 g protein  Fruit smoothie made with 4 oz. nonfat Greek yogurt,   cup berries, 1 scoop protein powder, and 4 oz skim milk.    Ten Lunches Under 250 Calories    Aim for lunch to be around 300-400 calories a day when trying to lose weight and get that protein in!    One- 200 calories, 11 g protein  1/3 cup tuna salad made with light bustillo on 1 slice whole grain bread  1 small peeled apple    Two- 250 calories, 16 g protein  1/3 cup lowfat cottage cheese    cup cooked green beans    small fruit cocktail (in natural juice)    Three- 200 calories, 11 g protein    grilled cheese sandwich on whole grain bread with lowfat cheese  2/3rd cup of tomato soup    Four- 250 calories, 22 g protein  Deli wrap: 1 oz sliced turkey, 1 oz sliced ham, 1 oz sliced chicken rolled up with 1 slice low-fat cheese  1 small orange    Five- 250 calories, 28 g protein  2/3rd cup chili with 1 oz shredded cheese  4 saltine crackers    Six- 250 calories, 22 g protein  1 cup fresh spinach with 2 oz chicken, 1/3rd  cup mandarin oranges, and 2 tablespoons sliced almonds with 1 tablespoon light vinaigrette dressing    Seven- 200 calories, 11 g protein  1 Tbsp sugar-free preserves and 1 Tbsp peanut butter on 1 slice whole grain toast    cup nonfat/lowfat Greek yogurt    Eight- 250 calories, 18 g protein  1 small soft-shell chicken taco with 1 oz shredded cheese, lettuce, tomato, salsa, and 1 Tbsp light sour cream    cup black beans    Nine- 225 calories, 13 g protein  2 ounces baked chicken  1/4 cup mashed potatoes    cup green beans    Ten- 200 calories, 21 g protein  Deli renetta: 2 oz roast beef or other deli meat with 1 tsp [place]io mayonnaise and sliced tomato, onion, and lettuce  1/3rd cup cottage cheese      Ten Dinners Under 300 calories    If you're eating a large breakfast and medium lunch, keep dinner small.  300-400 calories is ideal for most people depending on their caloric needs.    One- 300 calories, 12 g protein  1-inch thick slice of turkey meatloaf    cup baked butternut squash    Two- 200 calories, 9 g protein  Bread-less BLT: 3 slices turkey hawkins, sliced tomato, wrapped in a large lettuce leaf    cup peeled fruit    Three- 275 calories, 36 g protein  3 oz roasted chicken    cup cooked broccoli    cup shredded cheddar cheese    cup unsweetened applesauce    Four- 200 calories, 25 g protein  3 oz baked tilapia  1/3rd cup cooked carrots    cup yogurt    Five- 250 calories, 20 g protein  Grilled ham  n  Swiss: spread 2 tsp light margarine on 1 slice of whole grain bread.  Cut bread in half, layer 2 oz deli ham with 1 piece of Swiss cheese and grill until cheese is melted.    cup cooked vegetables    Six- 250 calories, 18 g protein  Vegetarian cheeseburger: 1 [place] cheeseburger topped with lettuce, onion, tomato, and ketchup/mustard    cup sweet potato fries    Seven- 250 calories, 18 g protein  Pork pot roast: 2 oz roasted pork loin, 1/3rd cup roasted carrots,   medium potato, cooked with   cup gravy    Eight- 300  calories, 25 g protein  2 oz meatballs (about 2 small meatballs)    cup spaghetti sauce  1/2 piece toast topped with 1 tsp light margarine and topped with garlic powder, toasted in oven    Nine- 250 calories, 16 g protein  Mexican pizza: one 8  corn tortilla topped with 2 oz chicken,   cup salsa, 2 tablespoons black beans, 2 tablespoons shredded cheese.  Bake until cheese is melted.    Ten- 250 calories, 22 g protein  Shrimp stir-egan: 3 oz cooked shrimp, 1/6th onion,   pepper,   cup chopped carrots sautéed in 1 tablespoon olive oil, topped with 2 tablespoons stir egan sauce and a pinch of sesame seeds        Basic Smoothie:    Start with a good, unsweetened protein powder (BiPro, TerasWhey, or your favorite) 2 scoops is usually at least 20 grams of protein. (goal of 20-30 grams), READ THE LABEL.    Drizzle (1 tablespoon) of olive oil (120 kirsten) and/or 1/2 an avocado (remove pit:175 kirsten) for good fat/satiation.  Adjust these to fit into your particular calorie needs.    Half a banana (70 kirsten )    Handful of frozen blueberries/raspberries/peach slices (or all) (30-40 kirsten)    Small,  thumbnail sized piece of george root (cut off skin) (10-20 kirsten)    3 leafs or more of Kale (strip away from stem and use just the leaves).  May use fresh or frozen.  Can also add spinach/parsley to mix it up.  (25 kirsten)    One stalk of celery torn in 2-3 pieces. Add more if desired. (5 calories)    1/3-1/2 of a cucumber cut into small sections. (15calories)    1 inch section of Red/Green/Yellow Bell pepper without the seeds. (10 calories)    Add 6 oz of water to allow for a drinkable consistency. More if needed.    Blend until smooth.  Drink right away or bring to work (keeping refrigerated) for an on the go lunch.    Rinse  right away to avoid stuck on mess.      Play around with different combinations or your personal favorites.  Try to get a bit of good fat, protein powder, fruits and veggies all in one smoothie.  Watch portion  sizes   The above recipe is roughly 450-500 calories depending on how much actually goes into smoothie. This would be a good meal replacement.  If trying to lose weight with a smoothie type diet, I recommend a complete multi-vitamin to make sure all nutrients are supported. Having 2 smoothies a day and one balance evening meal such as fish/stir fried vegetables/mushrooms and a glass of water will generally keep daily calorie content between 6171-0539 calories a day.  [place] women or those with a less than 1300 calorie daily target may need to pay closer attention to portion sizes to achieve their target daily caloric intake for sustainable/durable weight loss.

## 2021-06-23 NOTE — PROGRESS NOTES
"Bariatric Clinic Follow-Up Visit:    Ada Palmer is a 63 y.o.  female with Body mass index is 40.86 kg/m .  presenting here today for follow-up on non-surgical efforts for weight loss. Original Intake visit occurred on 10/25/18  with a weight of 229 lbs and BMI of 42.2.  Along with diet and behavior changes, she has been using bupropion/naltrexone. She initially presented for second opinion on surgical weight loss (was evaluated in [facility] program), had concerns about some of her history of binging and decided to pursue non surgical methods due to her concerns and we'd started meds to assist her weight loss goals.  See her intake visit notes for details on identified contributors to weight gain in the past.    Weight:   Wt Readings from Last 2 Encounters:   01/31/19 221 lb 9.6 oz (100.5 kg)   01/09/19 (!) 224 lb 8 oz (101.8 kg)    pounds  Height: 5' 1.75\" (1.568 m) (1/31/2019 10:38 AM)  Initial Weight: 229 lbs (1/9/2019  1:00 PM)  Weight: 221 lb 9.6 oz (100.5 kg) (1/31/2019 10:38 AM)  Weight loss from initial: 4.5 (1/9/2019  1:00 PM)  % Weight loss: 1.97 % (1/9/2019  1:00 PM)  BMI (Calculated): 40.9 (1/31/2019 10:38 AM)  SpO2: 96 % (1/31/2019 10:38 AM)      Comorbidities:  Patient Active Problem List   Diagnosis     Obesity, Class III, BMI 40-49.9 (morbid obesity) (H)     Secondary hyperparathyroidism, non-renal (H)     Low vitamin D level     Binge-eating disorder, in partial remission, mild       Current Outpatient Medications:      buPROPion (WELLBUTRIN) 75 MG tablet, Start one tablet with breakfast for 10 days then increase to twice daily with breakfast and supper if tolerated., Disp: 90 tablet, Rfl: 1     crisaborole (EUCRISA) 2 % Oint, DARCY EXT AA BID, Disp: , Rfl:      fluticasone (FLONASE) 50 mcg/actuation nasal spray, 2 sprays into each nostril daily., Disp: , Rfl:      naltrexone (DEPADE) 50 mg tablet, Start half tab daily for 10 days then increase to half tab twice daily (breakfast and supper).., Disp: " 90 tablet, Rfl: 1     omega-3/dha/epa/fish oil (FISH OIL-OMEGA-3 FATTY ACIDS) 300-1,000 mg capsule, Take 2 g by mouth daily., Disp: , Rfl:      pantoprazole (PROTONIX) 40 MG tablet, Take 40 mg by mouth daily., Disp: , Rfl:       Interim: Since our last visit, she has lost an additional pound since last visit/over the holidays.  She feels this represents some victory in that she'll usually gain quite a bit over the holidays. She's tolerating her bupropion/naltrexone and has adequate supply to get through her upcoming trip to [place] the next month. She's getting about 5500 steps daily on average, has a morning stretching routine and has started walking on the treadmill for 15 minutes 3 days weekly.  We discussed goals. She still struggles to get 3 meals daily in. She is contemplative about meal replacement when she returns from [place]. Reviewed protein goals set forth by dietician at last visit.    Plan:   1. Continue vitamin D and bupropion/naltrexone as it's agreeing with you. Discontinue naltrexone if any injury/accident or procedure than requires opiate pain medications (Vicodin/perocet/morphine/etc.).    2. Continue to aim for 3 meals daily to help curb cravings/potential for evening binges.  Aiming for 20 grams of protein 3 meals daily.  You could consider a meal replacement program when you return from [place] if struggling.  Either the 800 or 1000 calorie program would be feasible for you.    3. Continue your excellent workout habit, try to increase your steps 10% per week with the intention of getting at least 20% of your steps at a brisk pace. Aiming for at least 2 days a week of some strength training will optimizing metabolic health.    4.. Secondary hyperparathyroidism, will continue 5000 IUs D3 and recheck levels in Spring/early Summer.    5. Approaching 5% weight reduction. Short term goal of getting weight under 200 lbs, long term goal of stabilizing weight under BMI of 30. Goal of returning from  [place] around 215 lbs or less.      We discussed [facility] Basics including:  -eating 3 meals daily  -eating protein first  -eating slowly, chewing food well  -avoiding/limiting calorie containing beverages  -We discussed the importance of restorative sleep and stress management in maintaining a healthy weight.  -We discussed the National Weight Control Registry healthy weight maintenance strategies and ways to optimize metabolism.  -We discussed the importance of physical activity including cardiovascular and strength training in maintaining a healthier weight and explored viable options.    Most recent labs:  Lab Results   Component Value Date    WBC 6.3 10/25/2018    HGB 14.1 10/25/2018    HCT 41.4 10/25/2018    MCV 83 10/25/2018     10/25/2018     Lab Results   Component Value Date    CHOL 174 07/11/2018     Lab Results   Component Value Date    HDL 55 07/11/2018     Lab Results   Component Value Date    LDLCALC 103 07/11/2018     Lab Results   Component Value Date    TRIG 79 07/11/2018     No components found for: CHOLHDL  Lab Results   Component Value Date    ALT 27 07/11/2018    AST 22 07/11/2018    ALKPHOS 77 07/11/2018    BILITOT 0.4 07/11/2018     No results found for: HGBA1C  Lab Results   Component Value Date    MOSJJSEY33 528 10/25/2018     Lab Results   Component Value Date    EWXCNLNM87HK 26.7 (L) 10/25/2018     No results found for: FERRITIN  Lab Results   Component Value Date     (H) 10/25/2018     No results found for: 06026  No results found for: 7597  Lab Results   Component Value Date    TSH 1.33 07/11/2018     No results found for: TESTOSTERONE    DIETARY HISTORY    Positive Changes Since Last Visit: improved walking  Struggling With: 3 meals daily. Low volume of steps.    Knowledgeable in [place] Food Labels: yes  Getting Adequate Protein: no  Sleeping 7-8 hours/day often  Stress management good    PHYSICAL ACTIVITY PATTERNS:  Cardiovascular: walk/pickle  "ball  Strength Training: some.    REVIEW OF SYSTEMS  GENERAL/CONSTITUTIONAL:  nl  HEENT:   nl  CARDIOVASCULAR:   nl  PULMONARY:   nl  GASTROINTESTINAL:  nl  UROLOGIC:  nl  NEUROLOGIC:  nl  PSYCHIATRIC:  Tolerating meds  MUSCULOSKELETAL/RHEUMATOLOGIC   no injuries currently  ENDOCRINE:  na  DERMATOLOGIC:  No rash    PHYSICAL EXAM:  Vitals: /72 (Patient Site: Right Arm, Patient Position: Sitting, Cuff Size: Adult Large)   Pulse 67   Ht 5' 1.75\" (1.568 m)   Wt 221 lb 9.6 oz (100.5 kg)   LMP 10/03/2009   SpO2 96%   Breastfeeding? No   BMI 40.86 kg/m    Height: 5' 1.75\" (1.568 m) (1/31/2019 10:38 AM)  Initial Weight: 229 lbs (1/9/2019  1:00 PM)  Weight: 221 lb 9.6 oz (100.5 kg) (1/31/2019 10:38 AM)  Weight loss from initial: 4.5 (1/9/2019  1:00 PM)  % Weight loss: 1.97 % (1/9/2019  1:00 PM)  BMI (Calculated): 40.9 (1/31/2019 10:38 AM)  SpO2: 96 % (1/31/2019 10:38 AM)      GEN: Pleasant, well groomed, in no acute distress.  SKIN: No visible rashes  Pulm: no respiraotry difficulty.  Neuro: alert, no tremor, normal interaction/cognition.  Psych: still expresses concerns about portion control/potential for binging.  Looking forward to [place] trip this weekend.        25 minutes was spent in direct consultation, with over 50% of it spent in counseling regarding their plan for excess weight reduction and health modification.  Kalia Rojas MD  [facility]  10:41 AM    "

## 2021-06-24 NOTE — PROGRESS NOTES
Non-surgical Weight Loss Follow Up Diet Evaluation    Assessment:  This patient is a 63 y.o. female is being seen today for follow-up non-surgical nutritional evaluation. Today we reviewed the patients current eating habits and level of physical activity, and instructed on the changes that are required for successful weight loss outcomes.    Bupropion/Naltrexone:taking     Pt's Initial Weight: 229 lbs  Weight: (!) 226 lb 6.4 oz (102.7 kg)  Weight loss from initial: 2.6  % Weight loss: 1.14 %    BMI: Body mass index is 41.75 kg/m .  IBW: 105-110 lbs    Personal goal weight: 150lb     Pt Active Problem List Diagnosis:    Patient Active Problem List   Diagnosis     Obesity, Class III, BMI 40-49.9 (morbid obesity) (H)     Secondary hyperparathyroidism, non-renal (H)     Low vitamin D level     Binge-eating disorder, in partial remission, mild       Estimated RMR ([place]-St Jeor equation): 1536 calories  Protein requirements (.5grams to .9grams per pound IBW, 20-30% of calories, minimum of 60-80gm per day):  53-94 grams    Progress made since last visit: eating dinner earlier  Goal progress:  1. Eat fish weekly- goal met- continue with goal  2. 20 min, 5 times per week of exercise- goal met, patient was swimming and walking while on vacation  Concerns: drinking more alcohol than usual while on vacation      Diet Recall/Time:   Breakfast: yogurt with berries and almonds and an english muffin with peanut butter (20g)  Am Snack: none  Lunch: sandwich (20g)  Pm snack:none  Dinner: out to eat (20-30g)  HS Snack: skinny popcorn    Protein: 60-70g    Typical Snacks: skinny pop  Meals per week away from home: patient was eating out most nights due to being on vacation for the past three weeks.       Portion Sizes problematic? YES per patient/diet recall    Protein, vegetables/fruits, carbohydrates:   The patient and I discussed the importance of including lean/low fat protein at each meal and limiting carbohydrate intake to  less than 25% of plate volume.     Exercise  swimming  Daily exercise while on vacation, patient has desire to start this up once again now that she is back       PES statement:     1. (NI-1.3)Excessive energy intake related to Food and nutrition related knowledge deficit concerning excessive energy/oral intake as evidenced by Intake of high caloric density foods/beverages (juice, soda, alcohol) at meals and/or snacks; large portion; frequent grazing; Estimated intake that exceeds estimated daily energy intake; Frequent excessive fast food or restaurant intake; and BMI 41.75     Intervention:  Discussion:  1. Discussed physical activity options now that she does not have the resources she did while on vacation.  2. Plate Method: The patient and I discussed the importance of including lean/low  fat protein at each meal and limiting carbohydrate intake to less  than 25% of plate volume.  Instructions/Goals:   1. Include 20-30gm protein at each meal.  2. Increase vegetable/fruit intake, by having a vegetable or fruit with each meal daily.  3. Increase fluid intake to 64oz daily: choose plain or calorie/alcohol-free beverages.  4. Incorporate daily structured activity, but researching pool, swimming and walking programs.  5. Practice plate method: 1/2 plate lean/low fat protein source, vegetable/fruit, <25% of plate complex carbohydrates.    Handouts Provided:  No handouts provided at this time.    Monitor/Evaluation:    Weight change, physical activity, protein intake, fruit and vegetable intake, hydration    Pt will f/u in one month with bariatrician, and f/u in two months with RD.    Plan for next visit with RD:  Review plate method   Review carbohydrates/fiber  Exercise      Time In: 1:00p  Time Out: 1:30p3      ABN signed: Yes

## 2021-06-25 NOTE — PATIENT INSTRUCTIONS - HE
Plan:  1. Great job getting back on track. Being mindful about evening snacks/late late eating and ideally trying to get to bed within 5 hours of supper helps keep you on track. Hydrate well, consider a product like [employer].     2. Aiming for increased activity on the exercise bike while your foot heals. Overall goals with activity to build over the Spring to 150minutes/week of aerobic activity and at least 2 days of some strength training.     3. Refill of bupropion/naltrexone given today.  You can increase naltrexone up to one full tablet twice daily if tolerated.    4. D3 use to continue at 4000-6000IUs daily and recheck in the next 2 months (orders placed today) to check if PTH has normalized as we'd expect.      Exercise Guidance    Nearly everything that bothers us gets better when the proper amount of exercise can be done in the proper amounts.  Getting to that level safely and without injury is the key.  When it comes to weight loss, exercise is especially important in maintaining the weight loss.  Unfortunately, one of the harsh realities is that substantial weight loss slows our metabolism, often anywhere from 5-20%.    Our brain always remembers our heaviest weight and we can return to that if we're not mindful and moving regularly.  Our biology doesn't understand the concept of having too much energy, only not having enough.  As such, when we lose weight, it's thought that the brain interprets this as we're ill or in a famine and dials back our metabolism to limit further weight loss.  This is why exercise is so important in keeping the weight off and is the main reason people have some weight regain from their low weight point after weight loss.  We have to make up that 10-20% of calories not being burned.Since we can restrict our intake for only so long, exercise becomes very important in our long term healthy weigh maintenance to balance out the occasional indiscretion.    Generally, for every  5% body weight reduction in a weight loss season, a person needs to add  kilocalories of exercise in their daily routine to keep that weight off for the long term.  This is why it's vital to be starting your fitness regimen during weight loss season, it becomes so vital for long term success in maintaining that weight loss.    Additionally, all sorts of good enzymes and genes turn on with exercise and our stress, sleep, mood and bodies feel better when we can get to the point of making ourselves a little sweaty and short of breath 35-50 minutes most days of the week.    Who isn't ready for exercise? Well, if you get severe dizziness/palpitations, chest pain or short of breath/faint with even minimal activity like walking across a room or you're having to pause while going up a flight of stairs, then getting your heart and/or lungs fully evaluated prior to starting an exercise regimen is recommended. Everyone else can probably start a program, but everyone may start at a different point:  Some can set a 5-10 minute walking goal and others will be able to ride their bike for an hour.      Start with where you're at and look to add 10% more each week until you're at that 150 minutes or more a week (or 75 minutes/week or more of vigorous exercise). Moderate exercise can be estimated as the pace you can carry on a conversation and vigorous is the pace at which you can get 3-5 words out before having to take a breath.  If you're using heart rate monitoring, Moderate is about 60% of your maximum heart rate and vigorous about 75%. (Max heart rate estimated as 220 beats minus your age:  Example: 220-age of 45 =176 Beats per minute (BPM) maximum. 0.6X 176= 105 BPM (moderate), 132 BMP(vigorous)).    If you like to count steps, the 10,000 steps per day does correlate well with weight maintenance but try to make at least 20-25% of those steps at a brisk pace (like you are about to miss your bus).    Let's move!  Kalia  MD Bob.         Bupropion/Naltrexone Patient Information (trade name, CONTRAVE)    This medication is used for weight loss.  In studies people lost an averaged 5-8% of their starting weight compared to placebo (0-1%).    Often, this medication will be written as 2 separate prescriptions to improve cost to the patient. The trade name drug CONTRAVE comes as a combination of 8mg naltrexone/90mg bupropion and a 3 week escalating dose regimen going from one tablet daily up to a max of 2 tabs twice daily:  Week 1: one tablet in the daytime.  Week 2: one tablet A.M.  And One tablet in the PM.  Week 3: 2 tabs AM  And One tablet in the PM.  Week 4: 2 tabs AM and 2 tabs PM.     The generic Rx I write for is as follows:  I recommend starting One 75 mg bupropion and 1/2 a tablet of naltrexone (25mg) daily for 10 days and then moving up to One 75 mg bupropion tablet twice daily and half a naltrexone tablet twice daily.  Depending on your results/tolerance, we may increase the Bupropion up to a maximum of 150 mg twice daily of the immediate release medication or one Bupropion  mg-300 mg daily. Sometimes we'll have to go slower with the dose escalation.    Like any weight loss medication, showing results and having tolerable or no side effects is vital to continuing therapy and if either no significant weight loss after 8-12 weeks or too many side effects then we would discontinue therapy and look for alternative options/assistance.    AVOID taking with high fat meals as this increases bupropion levels, but some food in the stomach can help decrease nausea side effects from the Naltrexone.    People who take Metoprolol may need to decrease their dose of metoprolol as the bupropion increases the effect of metoprolol 2-4 fold in some cases and low blood pressure/low heart rate could occur.    Patients should STOP CONTRAVE if they have a severe allergic reaction to CONTRAVE.  Patients should be told that CONTRAVE should  be discontinued and not restarted if they experience a seizure while on treatment.    Patients should be advised that the excessive use or abrupt discontinuation of alcohol,benzodiazepines, antiepileptic drugs, or sedatives/hypnotics can increase the risk of seizure.    Patients should be advised to minimize or avoid use of alcohol.    Patients should be advised that if they previously used opioids, they may be more sensitive to lower doses of opioids and at risk of accidental overdose should they use opioids after CONTRAVE treatment is discontinued or temporarily interrupted.  Patients should be advised that because naltrexone, a component of CONTRAVE, can block the effects of opioids, they will not perceive any effect if they attempt to self-administer anyopioid drug in small doses while on CONTRAVE. Further advise patients that the attempt to administer large doses of any opioid or to bypass the blockade while on CONTRAVE may lead to serious injury, coma, or death.    Patients should be off all opioids for a minimum of 7 to 10 days before starting CONTRAVE in order to avoid precipitation of withdrawal. Advise patients they should not take CONTRAVE if they have any symptoms of opioid withdrawal.   Patients should be advised to call their healthcare provider if they experience increased blood pressure or heart rate.  Patients should be advised to notify their healthcare provider if they are taking, or plan to take, any prescription or over-the-counter drugs. Concern is warranted because CONTRAVE and other drugs may affect each other s metabolism.  Patients should be advised to notify their healthcare provider if they become pregnant, intend to become pregnant, or are breastfeeding during therapy.  CONTRAVE should NOT be taken if pregnant or nursing.    Patients with type 2 diabetes mellitus on antidiabetic therapy should be advised to monitor their blood glucose levels and report symptoms of hypoglycemia to their  healthcare provider(s).    Patients should be advised to swallow CONTRAVE tablets whole so that the release rate is not altered. Do not chew, divide, or crush tablets if using the Trade drug Contrave, dividing the generic naltrexone is fine.    As always, if any questions/concerns, don't hesitate to call us at the [facility].    Kalia Rojas MD  [phone].  3/21/2019          To help lose weight in a safe way and sustainable way, I'd like to start you on a 1300 calorie diet each day.  Understanding that every 3500 calorie deficit adds up to a pound of weight reduction, with the combination of light aerobic exercise, some modest weight training and diet, we should be able to lose around 1 to 2.5lbs weekly depending on your starting height and weight.    Hunger and fatigue are the enemies of weight loss and behavior change in general.  We become much more habit and reactive in our eating when we're hungry and/or tired and frequently have less self control.  It's not a personal failing, it's just body chemistry.   We can combat this by trying to avoid being tired and hungry at the same time.  To help this,  try to front load your calories in the first 10 hours of you day so you get into the fatigued evening hours reasonably full and you can control impulses/mindless eating a lot better and avoid those bedtime snacks/evening treats that the tired brain craves.    Weight loss goes through ups and downs and plateaus but if you stay on the program, you will enjoy success.   Commit to the process, try to be good at least 19 days out of 20 and continue to think about why you're doing this and what you're working towards. If you haven't thought of your reward for hitting your weight loss goals, think about it now. Using these little victory bribes along the way helps a lot.    Finding a diet that is satisfying and repeatable-- day in and day out, improves success.  An outline of how to  break up the day's food is given below.  It is a starting point and example of what a 1300 calorie diet looks like and you can modify the listed foods to suite your particular tastes, but pay attention to portions.   Do not skip breakfast as it will leave you hungry and lead to overeating at some point during the rest of the day.  If you can make Supper the last food for the day more days of the week then not it can help.  That means that those first 10-12 hours of the day and hitting your protein/intake targets for all three meals is vital to your success and evening hunger.    Start reading labels so you know that you're getting what you think you are and start measuring foods so you can eventually look at a portion and understand how it will provide the fuel you want.    Prepping raw veggies after you buy them (washing and putting into bags/tupperware for easy access), cooking several chicken breasts for the next 2-3 lunches and generally being able to grab and go what will keep you on target when time is short will greatly aid your success.  Prepare and plan and success will follow.    Read labels:  for protein portions/yogurt, protein bars etc looking for items with more than 10 grams of protein and less than 10 grams of sugar is very helpful.  Frozen meals should have at least 18 grams of protein, under 10 grams of sugar as well (typically around 300 calories).    Please note: if you've had previous bariatric surgery: wait 20-30 minutes before/after eating to drink your beverages to avoid early fullness/dumping syndrome/worsening malabsorption, early loss of fullness and hunger.  Start with eating your protein first, slow down your meals and chew thoroughly.          1300 calorie diet:  Think Big Breakfast, Medium Lunch, smaller dinner.    Breakfast goal of 300 calories-350 calories (egg, 1/3 to 1/2 cup cooked old fashioned oatmeal or steel cut oats and berries,3-4oz greek yogurt.)Glass of water and if you  like coffee (black) or tea.        Mid morning Snack or part of lunch, about 2-2.5 hrs later: 100 calories (cottage cheese/string cheese/ fruit or banana) and a handful of cruchy/green veggies (cucumber/celery/green peppers/broccoli).  Small glass of water    Lunch:  300 calories (tuna with little bustillo (no bread), apple, salad with drizzle of olive oil/balsamic vinegar for example)  Water    Snack:  100 calories.  4-5 oz Greek Yogurt with at least 17 grams of protein per serving.    Or Cottage cheese (lower sodium version preferable). Get this in about 2 hrs before you plan to have dinner. Protein drinks with at least 15-20 grams of protein and less than 6 grams of sugar could be used here to hit protein goals and decrease afternoon/evening hunger.  Glass of water    Dinner:  350 calories (4 oz meat or fish with cooked veggies or salad with minimal dressing, one piece of bread).  Glass of water or unsweetened tea with lemon    Snack: 100 calories Medium Apple or Small handful of nuts, about 2/3 to 3/4 an ounce (almonds/walnuts/cashews or pistachios are ideal).   Glass of water.    Try to avoid all soda and juice (low sodium V8 ok once a day).   You can do it!    Choose an activity that is fun/interesting and available to you such as  Going for a swim for 15 minutes, walking 40 minutes, elliptical 20 minutes or cycling 20 minutes as many days a week as you can.  If those times are too long for your fitness level, start at 10 minutes of movement and each week try to increase by 2 minutes each week.  The first 70 minutes a week (10 minutes a day only) of exercise drops the mortality rate of a sedentary person 30%!!!!  Our goal is to work up to 150 minutes or more per week of moderate to vigorous exercise  to optimize metabolism and prevent weight regain during maintenance.     10 minutes of weight lifting can be helpful as well or using some body weight exercises like wall squats, wall pushups, seat presses, yoga  moves. At least twice weekly helps maintain a good strength to weight ratio, more days is better.    If you are into strenuous weight lifting or prolonged exercise, use an online calculator for how many calories you've burned and if your exercise is lasting over 60 minutes, replace 25-30% of those calories burned immediately after exercise .  For the more limited exercise (less than 500 calories burned), there is no need to add extra food unless you notice a lot of hunger on your food journal.  Usually  Even a  calorie load after longer workouts is more than adequate.  For longer efforts, hunger will increase if you don't refuel afterwards and can get meal plan off track due to hunger, so replenish immediately after the workout to keep on the diet plan and feeling good.    Exercise example:  If you burned 1000 calories during the exercise, immediately (within 30 minutes) have a snack/replacement beverage totaling 250-300 calories and ideally have a 3:1 ratio of carbohydrate grams to protein grams to keep muscles ready for exercise the next day.  Have your next, full meal within 2-3 hours of exercise.    Tips for success:  KEEP A FOOD JOURNAL and a log of daily weights.  1.  Prepare proteins ahead of time (broil chicken breasts in bulk so you can grab and go), steel cut oats can be stored in casserole dish/bowl in the fridge for quick scoop in the morning and rewarm in microwave, make use of crock pot recipes (watch salt content).    2.  Drink a 8-12 oz glass of water every 2-3 hours when awake.  We often mistake hunger for thirst, especially when losing weight.    3. Remember your Reward and Motivation when things get hard.    4.  Weigh yourself every morning and record, you'll stay on track better and learn how your body loses weight. Don't worry about 1 or 2 day patterns, but when on track you'll notice good trend downward of weight over 3-4 day segments.    5. Call or use MyChart messaging if  "problems/concerns .    6.  Find a handful of meals/foods that keep you on track and get into a boring routine that is sustainable for you.    7.  Take a complete multivitamin just to make sure all micronutrients are adequate during weight loss.    8. If losing hair/brittle nails it usually means you are not taking enough protein.  Minimum goal is 60 grams daily of protein, most people with normal kidneys do well with upwards of 100-120 grams/day of protein. Consider taking Biotin as supplement or a \"Hair and Nail\" multivitamin.  If you are hypothyroid and losing hair, see you doctor for a check up of your levels if you haven't had one recently.    9.  Getting adequate sleep is very important for starting your day properly, when we are sleep deprived, our morning appetite is suppressed and without eating an adequate breakfast, we overeat later in the day when we're tired.  Aim for 7 hours of sleep nightly if possible.  If you sleep is disturbed, perform some introspection on stressors/depression/anxiety/PTSD events or possible sleep disorders and we can trouble shoot solutions/evaulations if issues persist.    Exercise during the day, meditative breathing before bed and after waking and removing the television from the bedroom are easy ways to improve quality of sleep.  Most people can tolerate 1-5 mg of melatonin about an 60-90 minutes before they plan to go to bed if these measures aren't helping.    10. Relaxation.  Controlled breathing exercises can lower stress levels in the brain.  One technique is 4, 7, 8 breathing:  Place the tip of your tongue behind your front teeth, breath in through your mouth for 4 seconds, Hold it for 7 seconds and breath out slowly, making a leaky tire noise for 8 seconds.  Repeat 4 times.  Ideally do at the start and end of your day or if feeling stressed.  It works and it's why meditation/yoga/martial arts are often very breath based activities.  There are breathing techniques for " "alertness as well as relaxation out there and they can be quite helpful.      What makes a person succeed with dramatic and sustained weight loss?    It's being at the right point in your life where you feel the need to lose the weight, not because anyone told you so but because of a voice inside of you that says, \"I am ready for this\".  You're now at a point where you may be feeling anxious, irritable and when you look in the mirror you do not recognize the person looking back.  Your true self is buried somewhere in that reflexion and you want to free it again.  This is the sort of motivation that leads to success, and it comes from you.    Because the only person that can lose that extra weight is you, I like my patients to focus on the mindset of being in Weight Loss Season.  This gives you permission to make the changes necessary to be consistent with the diet/activity and behavior changes that lead to successful, healthy weight loss.  Nearly any diet plan can work for weight loss, but keeping it healthy and nutrient based to prevent deficiencies/hair loss/fatigue or irritability is vital.  If you have a plan you want to try, we'll work with you to make sure no adjustments are needed to keep you healthy through your weight loss season and working with our Bariatric Nutritionists you'll be given expert guidance to customize your diet plan to suit your particular needs. If you don't have your own ideas in mind, we are always happy to suggest well researched and validated plans that provide enough food to prevent hunger but still tap into your excess fat reserves and lose weight in a sustainable fashion.  There is great evidence that lean protein/healthy fat intake with good fiber intake while minimizing simple starches/carbs produces reliable/sustainable weight loss in most people. But some feel more connected to an intermittent fasting/fast mimicking or ketogenic diet.  These protocols can be hard for many to " "stick with and that's why we prefer the protein/healthy fat focused diets but if these alternative strategies appeal to you, we can work with you to optimize your knowledge and results with these tools.    Losing weight is a temporary commitment, but you need to be \"All In\" to have a good weight loss season.  To avoid frustration, you have to be willing to be nearly perfect 19 out of 20 days or even better than that. But, weight loss season is generally only 4-8 months in length. After that length of time, it can be hard to maintain a negative calorie balance and our brain, motivations and metabolism will usually bring you to a plateau that cannot be broken in this modern world where other commitments start to take priority. That's when we look to stabilize the weight loss you've achieved.  If you've reached your goal by that time, fantastic, and job well done.  If there is more to go, then after a few months of stabilization, we can usually attack that previous plateau and break into new territory.    Because of this time limitation, we want to really get to work right away and get into a sustainable routine ASAP.  One of the best predictors of how much weight you're going to lose throughout the season is how much you lose in the first 6 weeks, so prepare well and jump in with both feet.      Occasionally, people may feel like they cannot commit fully to the changes necessary and may want to change one thing at a time and \"get used to\" the idea of losing weight.  That is OK because that is where they are in their life, and they cannot fully commit for any number of reasons.  It's part of that internal motivation and they just haven't reached PRIORTY NUMBER ONE status yet. It's possible that what they need is more time to reach that point and I am always willing to work with people that want to \"dabble\", but understand, the amount of success obtained with half measures, is much less than half results. But Behavior " "Change cannot occur until a person goes through the contemplation and preparation phases necessary to have successful action and we are more than willing to give you targets to move along the spectrum and get to that point where you feel ready to  commit fully to the weight loss season.      As you go through your plan, look for things to keep your motivation rolling.  The most successful people have a goal or target/reward that they are working towards.  Having a reward that celebrates your new fitness, mobility and energy is the best sort because it will encourage you to do well with the weight maintenance phase and long term lifestyle changes that promotes keeping the weight off for the long term.  Usually, \"getting healthier\" or improving blood tests or losing weight so your clothes fit better is not as internally motivating as having a tangible reward.  A more motivating reward is one that isn't food based, is affordable, but something special:  Something you won't be getting unless you achieve your goal.   It s important to keep to the rule of success:  in order to get the reward, your goal MUST be achieved. Write this reward down, where you can look at it daily and keep it in the front of your mind as you go through your weight loss season and it will help keep you on track.    Tools that help change behavior are vital for success. The most studied and most supported tool for weight loss is nothing more than writing down your food and weight every day.  Every Day.  Accurately and completely.  When you commit to weight loss season, this information tells you whether you're getting ENOUGH food to fuel your weight loss properly as well as teaches you the interaction between different foods you eat and how your body responds with weight loss.  You'll see that sometimes after a heavy workout you don't see the scale move until 2-3 days later.  How saltier meals (chili for example) may make you retain water for 4-5 " days before you see the weight come off, you'll get used to the mini-plateaus that develop after a good 3-8 lb drop in weight as well as how you break through if you keep working the diet as you should.    Weight loss is not a linear process, there are mini ups and downs.  Learning how your body loses weight and getting comfortable with that is very rewarding. The act of writing words on paper also solidifies your will power and commitment to the season of weight loss and that by itself changes your brain chemistry/appetite, motivation and prepares you for maximal success.      Behavior change is all about getting into a new routine.  The old habits and routine have to change because without changing the circumstances of how you gained your weight, it's unlikely you'll enjoy satisfying results. If you have snacking habits, like every time you walk through the kitchen you grab a little something, well, that habit has to change and be replaced by a new habit.  It can be something as simple as keeping a doodle pad on the counter that you make a few scribbles and then walk through the kitchen having not opened the cupboard, or starting with a glass of water and leaving the kitchen without anything else, or checking your food journal to see how many calories you have left for the day.  Boredom is the enemy as are the old habits. Break new ground and try to push those old habits into a deep hole.      Finally, exercise always helps.  While not mandatory to lose weight, every little bit helps and exercise has so many other benefits that to not work it into your plan is to miss out on all the mood, sleep, stress and general health benefits that come from making yourself a little short of breath and sweaty at least 3-4 days out of the week.  The metabolism and calorie burn benefits aside, almost every chronic ailment in medicine gets better with proper, aerobic exercise.  Allow yourself to start slow and let your body  "prepare itself to accept harder training 4-6 weeks down the road, but start now and commit to a plan.  Whether you have the means to hire a , join a gym or just walk out your front door or go down to your basement for a video workout, get into a exercise routine and  after 3 weeks of at least 3 times a week exercise you should be at a point where you can slowly start ramping up 10% each week to our goal of at least 150-300minutes weekly of aerobic exercise and at least twice weekly resistance training/strenghtening with weights/bands or body weight exercises.     I am a big fan of modifying the free training plan, \"Couch to 5k\" for almost all of my patients. Just type it into [employer] or look it up on your smart phone nikhil store.  To modify the plan,  you can use the training plan for whatever aerobic activity you do (bike/treadmill/elliptical/rower/pool/etc). During the \"jog\" intervals you just move a little faster or harder, or increase the tension or incline.  You use those little intervals to switch up the workout and recruit more muscles and pump the blood a little more and then recover again in the \"walk\" intervals by slowing down, decreasing the incline or turning down the tension.  3-4 days a week is not that much to ask and the benefits are enormous.  Start slow and develop the base from which you can then build on and reduce the risk of injury.  It's much more important 2-4 months from now to be enjoying your exercise then it is to over exert yourself at the start and hurting yourself.  Starting slowly allows your body to accept the training better down the road when the exercise becomes crucial for weight maintenance.  Without exercise down the road during your maintenance phase, all this hard work you are about to put in can be undone. It usually takes about 100-300 calories a day of exercise to maintain a weight loss and our focus during weight loss season is to generate the routine/activities and " hobbies that make that enjoyable/sustainable.    Thanks for taking this first and most important step in your weight loss season.  Commit to it and we will cheerlead you all the way to success.  When things get tough or off track we'll offer guidance and analysis and when you reach your goal we'll celebrate your success.  In the end, it is all about your success and what you do with it.      Kalia Rojas MD  [facility]  [phone]    Scheduling phone# [phone]

## 2021-06-25 NOTE — PROGRESS NOTES
"Bariatric Clinic Follow-Up Visit:    Ada Palmer is a 63 y.o.  female with Body mass index is 40.55 kg/m .  presenting here today for follow-up on non-surgical efforts for weight loss. Original Intake visit occurred on 10/25/18 with a weight of 229 lbs and BMI of 42.2.  Along with diet and behavior changes, she has been using bupropion/naltrexone to assist her weight loss goals.  See her intake visit notes for details on identified contributors to weight gain in the past.    Weight:   Wt Readings from Last 2 Encounters:   03/21/19 219 lb 14.4 oz (99.7 kg)   03/01/19 (!) 226 lb 6.4 oz (102.7 kg)    pounds  Height: 5' 1.75\" (1.568 m) (3/21/2019  1:01 PM)  Initial Weight: 229 lbs (3/1/2019  1:00 PM)  Weight: 219 lb 14.4 oz (99.7 kg) (3/21/2019  1:01 PM)  Weight loss from initial: 2.6 (3/1/2019  1:00 PM)  % Weight loss: 1.14 % (3/1/2019  1:00 PM)  BMI (Calculated): 40.6 (3/21/2019  1:01 PM)  SpO2: 97 % (3/21/2019  1:01 PM)      Comorbidities:  Patient Active Problem List   Diagnosis     Obesity, Class III, BMI 40-49.9 (morbid obesity) (H)     Secondary hyperparathyroidism, non-renal (H)     Low vitamin D level     Binge-eating disorder, in partial remission, mild       Current Outpatient Medications:      buPROPion (WELLBUTRIN) 75 MG tablet, One tablet twice daily.., Disp: 90 tablet, Rfl: 1     crisaborole (EUCRISA) 2 % Oint, DARCY EXT AA BID, Disp: , Rfl:      fluticasone (FLONASE) 50 mcg/actuation nasal spray, 2 sprays into each nostril daily., Disp: , Rfl:      naltrexone (DEPADE) 50 mg tablet, Up to one tablet twice daily., Disp: 180 tablet, Rfl: 1     omega-3/dha/epa/fish oil (FISH OIL-OMEGA-3 FATTY ACIDS) 300-1,000 mg capsule, Take 2 g by mouth daily., Disp: , Rfl:      pantoprazole (PROTONIX) 40 MG tablet, Take 40 mg by mouth daily., Disp: , Rfl:       Interim: Since our last visit, she has dropped 7 lbs since returning from [place], back on track after 3 weeks of eating out. Has noticed less tendency towards " binging on meds.  Plan:   1. Great job getting back on track. Being mindful about evening snacks/late late eating and ideally trying to get to bed within 5 hours of supper helps keep you on track. Hydrate well, consider a product like [employer].     2. Aiming for increased activity on the exercise bike while your foot heals. Overall goals with activity to build over the Spring to 150minutes/week of aerobic activity and at least 2 days of some strength training.     3. Refill of bupropion/naltrexone given today.  You can increase naltrexone up to one full tablet twice daily if tolerated.    4. D3 use to continue at 4000-6000IUs daily and recheck in the next 2 months (orders placed today) to check if PTH has normalized as we'd expect.      1. Secondary hyperparathyroidism, non-renal (H)  Parathyroid Hormone Intact   2. Obesity, Class III, BMI 40-49.9 (morbid obesity) (H)  naltrexone (DEPADE) 50 mg tablet    buPROPion (WELLBUTRIN) 75 MG tablet   3. Low vitamin D level  Vitamin D, Total (25-Hydroxy)     Return in about 6 weeks (around 5/2/2019).      We discussed [facility]ariat[facility] Basics including:  -eating 3 meals daily  -eating protein first  -eating slowly, chewing food well  -avoiding/limiting calorie containing beverages  -We discussed the importance of restorative sleep and stress management in maintaining a healthy weight.  -We discussed the National Weight Control Registry healthy weight maintenance strategies and ways to optimize metabolism.  -We discussed the importance of physical activity including cardiovascular and strength training in maintaining a healthier weight and explored viable options.    Most recent labs:  Lab Results   Component Value Date    WBC 6.3 10/25/2018    HGB 14.1 10/25/2018    HCT 41.4 10/25/2018    MCV 83 10/25/2018     10/25/2018     Lab Results   Component Value Date    CHOL 174 07/11/2018     Lab Results   Component Value Date    HDL 55 07/11/2018     Lab Results  "  Component Value Date    LDLCALC 103 07/11/2018     Lab Results   Component Value Date    TRIG 79 07/11/2018     No components found for: CHOLHDL  Lab Results   Component Value Date    ALT 27 07/11/2018    AST 22 07/11/2018    ALKPHOS 77 07/11/2018    BILITOT 0.4 07/11/2018     No results found for: HGBA1C  Lab Results   Component Value Date    CMGCQULC05 528 10/25/2018     Lab Results   Component Value Date    AJRJIODF25AN 26.7 (L) 10/25/2018     No results found for: FERRITIN  Lab Results   Component Value Date     (H) 10/25/2018     No results found for: 88439  No results found for: 7597  Lab Results   Component Value Date    TSH 1.33 07/11/2018     No results found for: TESTOSTERONE    DIETARY HISTORY    Positive Changes Since Last Visit: back on track after vacation which led to significant weight gain  Struggling With: exercise.    Knowledgeable in Reading Food Labels: improving  Getting Adequate Protein: improving  Sleeping 7-8 hours/day yes  Stress management good    PHYSICAL ACTIVITY PATTERNS:  Cardiovascular: some walking/bike/treadmill  Strength Training: limited.    REVIEW OF SYSTEMS  GENERAL/CONSTITUTIONAL:  nl  HEENT:   nl  CARDIOVASCULAR:   nl  PULMONARY:   nl  GASTROINTESTINAL:  nl  UROLOGIC:  nl  NEUROLOGIC:  nl  PSYCHIATRIC:  nl  MUSCULOSKELETAL/RHEUMATOLOGIC   nl  ENDOCRINE:  nl  DERMATOLOGIC:  nl    PHYSICAL EXAM:  Vitals: /80 (Patient Site: Right Arm, Patient Position: Sitting, Cuff Size: Adult Large)   Pulse 67   Ht 5' 1.75\" (1.568 m)   Wt 219 lb 14.4 oz (99.7 kg)   LMP 10/03/2009   SpO2 97%   Breastfeeding? No   BMI 40.55 kg/m    Height: 5' 1.75\" (1.568 m) (3/21/2019  1:01 PM)  Initial Weight: 229 lbs (3/1/2019  1:00 PM)  Weight: 219 lb 14.4 oz (99.7 kg) (3/21/2019  1:01 PM)  Weight loss from initial: 2.6 (3/1/2019  1:00 PM)  % Weight loss: 1.14 % (3/1/2019  1:00 PM)  BMI (Calculated): 40.6 (3/21/2019  1:01 PM)  SpO2: 97 % (3/21/2019  1:01 PM)      GEN: Pleasant, well " groomed, in no acute distress  HEENT: PEERL, EOMI, airway clear .  NECK: No swelling.  HEART: Rhythm regular, rate regular, no murmur   LUNGS: Clear without crackles or wheezes. No cough.  ABDOMEN: obese.  EXTREMITIES: no tremor palpable peripheral pulses, 2 plus.  NEURO: Alert and Oriented X3, normal gait and speech..  SKIN: No visible rashes.        25 minutes was spent in direct consultation, with over 50% of it spent in counseling regarding their plan for excess weight reduction and health modification.  Kalia Rojas MD  [facility]  1:20 PM

## 2021-07-13 ENCOUNTER — RECORDS - HEALTHEAST (OUTPATIENT)
Dept: ADMINISTRATIVE | Facility: CLINIC | Age: 66
End: 2021-07-13

## 2021-07-21 ENCOUNTER — RECORDS - HEALTHEAST (OUTPATIENT)
Dept: ADMINISTRATIVE | Facility: CLINIC | Age: 66
End: 2021-07-21

## 2021-07-22 ENCOUNTER — RECORDS - HEALTHEAST (OUTPATIENT)
Dept: SCHEDULING | Facility: CLINIC | Age: 66
End: 2021-07-22

## 2021-07-22 DIAGNOSIS — Z12.31 OTHER SCREENING MAMMOGRAM: ICD-10-CM

## 2021-08-03 PROBLEM — E21.1 SECONDARY HYPERPARATHYROIDISM, NON-RENAL (H): Status: RESOLVED | Noted: 2019-01-31 | Resolved: 2019-05-29

## 2021-08-03 PROBLEM — E66.01 OBESITY, CLASS III, BMI 40-49.9 (MORBID OBESITY) (H): Status: RESOLVED | Noted: 2019-01-31 | Resolved: 2019-05-22

## 2021-08-07 ENCOUNTER — HEALTH MAINTENANCE LETTER (OUTPATIENT)
Age: 66
End: 2021-08-07

## 2021-10-02 ENCOUNTER — HEALTH MAINTENANCE LETTER (OUTPATIENT)
Age: 66
End: 2021-10-02

## 2022-05-23 ENCOUNTER — LAB REQUISITION (OUTPATIENT)
Dept: LAB | Facility: CLINIC | Age: 67
End: 2022-05-23

## 2022-05-23 DIAGNOSIS — Z00.00 ENCOUNTER FOR GENERAL ADULT MEDICAL EXAMINATION WITHOUT ABNORMAL FINDINGS: ICD-10-CM

## 2022-05-23 DIAGNOSIS — Z83.49 FAMILY HISTORY OF OTHER ENDOCRINE, NUTRITIONAL AND METABOLIC DISEASES: ICD-10-CM

## 2022-05-23 LAB
ALBUMIN SERPL-MCNC: 3.7 G/DL (ref 3.5–5)
ALP SERPL-CCNC: 74 U/L (ref 45–120)
ALT SERPL W P-5'-P-CCNC: 32 U/L (ref 0–45)
ANION GAP SERPL CALCULATED.3IONS-SCNC: 9 MMOL/L (ref 5–18)
AST SERPL W P-5'-P-CCNC: 25 U/L (ref 0–40)
BILIRUB SERPL-MCNC: 0.9 MG/DL (ref 0–1)
BUN SERPL-MCNC: 12 MG/DL (ref 8–22)
CALCIUM SERPL-MCNC: 9 MG/DL (ref 8.5–10.5)
CHLORIDE BLD-SCNC: 107 MMOL/L (ref 98–107)
CHOLEST SERPL-MCNC: 155 MG/DL
CO2 SERPL-SCNC: 26 MMOL/L (ref 22–31)
CREAT SERPL-MCNC: 0.74 MG/DL (ref 0.6–1.1)
FASTING STATUS PATIENT QL REPORTED: NORMAL
GFR SERPL CREATININE-BSD FRML MDRD: 89 ML/MIN/1.73M2
GLUCOSE BLD-MCNC: 82 MG/DL (ref 70–125)
HDLC SERPL-MCNC: 51 MG/DL
LDLC SERPL CALC-MCNC: 91 MG/DL
POTASSIUM BLD-SCNC: 4.2 MMOL/L (ref 3.5–5)
PROT SERPL-MCNC: 6.5 G/DL (ref 6–8)
SODIUM SERPL-SCNC: 142 MMOL/L (ref 136–145)
TRIGL SERPL-MCNC: 65 MG/DL
TSH SERPL DL<=0.005 MIU/L-ACNC: 1.26 UIU/ML (ref 0.3–5)

## 2022-05-23 PROCEDURE — 80053 COMPREHEN METABOLIC PANEL: CPT | Performed by: FAMILY MEDICINE

## 2022-05-23 PROCEDURE — 84443 ASSAY THYROID STIM HORMONE: CPT | Performed by: FAMILY MEDICINE

## 2022-05-23 PROCEDURE — 80061 LIPID PANEL: CPT | Performed by: FAMILY MEDICINE

## 2022-09-03 ENCOUNTER — HEALTH MAINTENANCE LETTER (OUTPATIENT)
Age: 67
End: 2022-09-03